# Patient Record
Sex: MALE | Race: WHITE | NOT HISPANIC OR LATINO | Employment: FULL TIME | URBAN - METROPOLITAN AREA
[De-identification: names, ages, dates, MRNs, and addresses within clinical notes are randomized per-mention and may not be internally consistent; named-entity substitution may affect disease eponyms.]

---

## 2023-08-09 ENCOUNTER — HOSPITAL ENCOUNTER (OUTPATIENT)
Dept: DATA CONVERSION | Facility: HOSPITAL | Age: 43
Discharge: HOME | End: 2023-08-09

## 2023-08-09 DIAGNOSIS — M25.851 OTHER SPECIFIED JOINT DISORDERS, RIGHT HIP: ICD-10-CM

## 2023-08-09 LAB
ALBUMIN SERPL-MCNC: 4.7 GM/DL (ref 3.5–5)
ALBUMIN/GLOB SERPL: 2 RATIO (ref 1.5–3)
ALP BLD-CCNC: 58 U/L (ref 35–125)
ALT SERPL-CCNC: 12 U/L (ref 5–40)
ANION GAP SERPL CALCULATED.3IONS-SCNC: 9 MMOL/L (ref 0–19)
ANTICOAGULANT: NORMAL
APTT PPP: 23.8 SEC (ref 22–32.5)
AST SERPL-CCNC: 15 U/L (ref 5–40)
BASOPHILS # BLD AUTO: 0 K/UL (ref 0–0.22)
BASOPHILS NFR BLD AUTO: 0 % (ref 0–1)
BILIRUB SERPL-MCNC: 0.5 MG/DL (ref 0.1–1.2)
BUN SERPL-MCNC: 15 MG/DL (ref 8–25)
BUN/CREAT SERPL: 16.7 RATIO (ref 8–21)
CALCIUM SERPL-MCNC: 10 MG/DL (ref 8.5–10.4)
CHLORIDE SERPL-SCNC: 105 MMOL/L (ref 97–107)
CO2 SERPL-SCNC: 23 MMOL/L (ref 24–31)
CREAT SERPL-MCNC: 0.9 MG/DL (ref 0.4–1.6)
DEPRECATED RDW RBC AUTO: 43 FL (ref 37–54)
DIFFERENTIAL METHOD BLD: ABNORMAL
EOSINOPHIL # BLD AUTO: 0.02 K/UL (ref 0–0.45)
EOSINOPHIL NFR BLD: 0.5 % (ref 0–3)
ERYTHROCYTE [DISTWIDTH] IN BLOOD BY AUTOMATED COUNT: 12.1 % (ref 11.7–15)
GFR SERPL CREATININE-BSD FRML MDRD: 109 ML/MIN/1.73 M2
GLOBULIN SER-MCNC: 2.3 G/DL (ref 1.9–3.7)
GLUCOSE SERPL-MCNC: 98 MG/DL (ref 65–99)
HCT VFR BLD AUTO: 46.6 % (ref 41–50)
HGB BLD-MCNC: 15.5 GM/DL (ref 13.5–16.5)
IMM GRANULOCYTES # BLD AUTO: 0 K/UL (ref 0–0.1)
INR PPP: 1 (ref 0.86–1.16)
LYMPHOCYTES # BLD AUTO: 1.46 K/UL (ref 1.2–3.2)
LYMPHOCYTES NFR BLD MANUAL: 34 % (ref 20–40)
MCH RBC QN AUTO: 31.6 PG (ref 26–34)
MCHC RBC AUTO-ENTMCNC: 33.3 % (ref 31–37)
MCV RBC AUTO: 94.9 FL (ref 80–100)
MONOCYTES # BLD AUTO: 0.33 K/UL (ref 0–0.8)
MONOCYTES NFR BLD MANUAL: 7.7 % (ref 0–8)
NEUTROPHILS # BLD AUTO: 2.47 K/UL
NEUTROPHILS # BLD AUTO: 2.49 K/UL (ref 1.8–7.7)
NEUTROPHILS.IMMATURE NFR BLD: 0 % (ref 0–1)
NEUTS SEG NFR BLD: 57.8 % (ref 50–70)
PLATELET # BLD AUTO: 130 K/UL (ref 150–450)
PLATELET BLD QL SMEAR: ABNORMAL
PMV BLD AUTO: 13.1 CU (ref 7–12.6)
POTASSIUM SERPL-SCNC: 4.4 MMOL/L (ref 3.4–5.1)
PROT SERPL-MCNC: 7 G/DL (ref 5.9–7.9)
PROTHROMBIN TIME: 10 SEC (ref 9.3–12.7)
RBC # BLD AUTO: 4.91 M/UL (ref 4.5–5.5)
SODIUM SERPL-SCNC: 137 MMOL/L (ref 133–145)
WBC # BLD AUTO: 4.3 K/UL (ref 4.5–11)

## 2023-08-22 ENCOUNTER — HOSPITAL ENCOUNTER (OUTPATIENT)
Dept: DATA CONVERSION | Facility: HOSPITAL | Age: 43
Discharge: HOME | End: 2023-08-22
Payer: COMMERCIAL

## 2023-08-22 DIAGNOSIS — M25.351 OTHER INSTABILITY, RIGHT HIP: ICD-10-CM

## 2023-08-22 DIAGNOSIS — S73.191A OTHER SPRAIN OF RIGHT HIP, INITIAL ENCOUNTER: ICD-10-CM

## 2023-08-22 DIAGNOSIS — M24.051 LOOSE BODY IN RIGHT HIP: ICD-10-CM

## 2023-08-22 DIAGNOSIS — M94.8X6 OTHER SPECIFIED DISORDERS OF CARTILAGE, LOWER LEG: ICD-10-CM

## 2023-08-22 DIAGNOSIS — M25.851 OTHER SPECIFIED JOINT DISORDERS, RIGHT HIP: ICD-10-CM

## 2023-10-02 PROBLEM — S73.191A TEAR OF RIGHT ACETABULAR LABRUM: Status: ACTIVE | Noted: 2023-10-02

## 2023-10-02 PROBLEM — S83.206A RIGHT KNEE MENISCAL TEAR: Status: ACTIVE | Noted: 2023-10-02

## 2023-10-02 PROBLEM — M25.851 FEMOROACETABULAR IMPINGEMENT OF RIGHT HIP: Status: ACTIVE | Noted: 2023-10-02

## 2023-10-02 RX ORDER — OXYCODONE AND ACETAMINOPHEN 5; 325 MG/1; MG/1
1 TABLET ORAL EVERY 4 HOURS PRN
COMMUNITY
End: 2024-02-14 | Stop reason: ALTCHOICE

## 2023-10-02 RX ORDER — GABAPENTIN 100 MG/1
100 CAPSULE ORAL NIGHTLY
COMMUNITY

## 2023-10-02 RX ORDER — CYCLOSPORINE 0.5 MG/ML
1 EMULSION OPHTHALMIC 2 TIMES DAILY
COMMUNITY

## 2023-10-02 RX ORDER — INDOMETHACIN 75 MG/1
75 CAPSULE, EXTENDED RELEASE ORAL EVERY EVENING
COMMUNITY
End: 2024-02-14 | Stop reason: ALTCHOICE

## 2023-10-02 RX ORDER — METHOCARBAMOL 750 MG/1
750 TABLET, FILM COATED ORAL 3 TIMES DAILY PRN
COMMUNITY
End: 2024-02-14 | Stop reason: ALTCHOICE

## 2023-10-02 RX ORDER — ALPRAZOLAM 0.25 MG/1
0.25 TABLET ORAL NIGHTLY PRN
COMMUNITY
End: 2024-02-14 | Stop reason: ALTCHOICE

## 2023-10-02 RX ORDER — ASPIRIN 81 MG/1
81 TABLET ORAL 2 TIMES DAILY
COMMUNITY
End: 2024-02-14 | Stop reason: ALTCHOICE

## 2023-10-02 RX ORDER — ONDANSETRON 4 MG/1
4 TABLET, ORALLY DISINTEGRATING ORAL EVERY 8 HOURS PRN
COMMUNITY
End: 2024-02-14 | Stop reason: ALTCHOICE

## 2023-10-02 RX ORDER — MAGNESIUM 250 MG
2 TABLET ORAL DAILY
COMMUNITY

## 2023-10-02 RX ORDER — FLUTICASONE FUROATE 27.5 UG/1
1 SPRAY, METERED NASAL DAILY PRN
COMMUNITY

## 2023-10-04 ENCOUNTER — OFFICE VISIT (OUTPATIENT)
Dept: ORTHOPEDIC SURGERY | Facility: HOSPITAL | Age: 43
End: 2023-10-04
Payer: COMMERCIAL

## 2023-10-04 DIAGNOSIS — S73.191D TEAR OF RIGHT ACETABULAR LABRUM, SUBSEQUENT ENCOUNTER: Primary | ICD-10-CM

## 2023-10-04 PROCEDURE — 99024 POSTOP FOLLOW-UP VISIT: CPT | Performed by: PHYSICIAN ASSISTANT

## 2023-10-04 NOTE — LETTER
October 4, 2023     Zaid Martinez MD  [address]  [facility], [place] [facility] [place] [address]    Patient: Roney Malik   YOB: 1980   Date of Visit: 10/4/2023       Dear Dr. Zaid Martinez MD:    Thank you for referring Roney Malik to me for evaluation. Below are my notes for this consultation.  If you have questions, please do not hesitate to call me. I look forward to following your patient along with you.       Sincerely,     Estrella Chavarria PA-C      CC: No Recipients  ______________________________________________________________________________________    Patient presents for POV R hip scope DOS 08/22/23. CT    Patient is here today 6 weeks out from his right hip arthritis.  Date of surgery 8/22/2023.  Overall he is doing well.  He states he is improving daily.  He has a little bit of pain in range of motion and he does have stiffness in the right hip compared to the left.  I think that is contributing to his current symptoms.  He is doing physical therapy at [facility] in [place] and I had him advised to him his therapist to defer strength and only work on range of motion and once his right side is equal to the left side he can get back to focusing on strength.  I will see him back in 6 weeks.      Estrella Chavarria PA-C

## 2023-10-04 NOTE — PROGRESS NOTES
Patient presents for POV R hip scope DOS 08/22/23. CT    Patient is here today 6 weeks out from his right hip arthritis.  Date of surgery 8/22/2023.  Overall he is doing well.  He states he is improving daily.  He has a little bit of pain in range of motion and he does have stiffness in the right hip compared to the left.  I think that is contributing to his current symptoms.  He is doing physical therapy at [facility] in [place] and I had him advised to him his therapist to defer strength and only work on range of motion and once his right side is equal to the left side he can get back to focusing on strength.  I will see him back in 6 weeks.      Estrella Chavarria PA-C

## 2023-11-15 ENCOUNTER — OFFICE VISIT (OUTPATIENT)
Dept: ORTHOPEDIC SURGERY | Facility: HOSPITAL | Age: 43
End: 2023-11-15
Payer: COMMERCIAL

## 2023-11-15 DIAGNOSIS — S73.191D TEAR OF RIGHT ACETABULAR LABRUM, SUBSEQUENT ENCOUNTER: Primary | ICD-10-CM

## 2023-11-15 PROCEDURE — 99024 POSTOP FOLLOW-UP VISIT: CPT | Performed by: PHYSICIAN ASSISTANT

## 2023-11-15 NOTE — PROGRESS NOTES
Patient presents for POV of right hip (8/22/23)    Patient is here today 12 weeks out from his right hip arthroscopy.  Date of surgery 8/22/2023.  Overall he is doing really well.  He has been back to work.  He states this past week he has actually had to do some work outside standing on some uneven gravel and this is really aggravated his hip.  He is only had to use a couple ibuprofen.  He continues to work physical therapy.  This pain is different than his preoperative pain.  He continues to have a little bit of tightness with flexion but they are continuing to work on that physical therapy and it is improving.  He is going avoid this outdoor standing while at work.  He stated he did not need a work note today but I am able to provide him 1 certainly if that is required.  He is also just can utilize over-the-counter anti-inflammatories.  We will plan to see him back in 3 months.  He also briefly mentions similar pain on his left hip.  He he has known femoral acetabular impingement but is not interested in surgery at this time but does plan on having him in the future.  He will continue conservative management including therapy exercises for the left hip as well.      Estrella Chavarria PA-C

## 2024-02-14 ENCOUNTER — OFFICE VISIT (OUTPATIENT)
Dept: ORTHOPEDIC SURGERY | Facility: HOSPITAL | Age: 44
End: 2024-02-14
Payer: COMMERCIAL

## 2024-02-14 ENCOUNTER — HOSPITAL ENCOUNTER (OUTPATIENT)
Dept: RADIOLOGY | Facility: HOSPITAL | Age: 44
Discharge: HOME | End: 2024-02-14
Payer: COMMERCIAL

## 2024-02-14 DIAGNOSIS — S73.191D TEAR OF RIGHT ACETABULAR LABRUM, SUBSEQUENT ENCOUNTER: ICD-10-CM

## 2024-02-14 DIAGNOSIS — M25.562 ACUTE BILATERAL KNEE PAIN: Primary | ICD-10-CM

## 2024-02-14 DIAGNOSIS — M25.561 ACUTE BILATERAL KNEE PAIN: Primary | ICD-10-CM

## 2024-02-14 DIAGNOSIS — M25.561 ACUTE BILATERAL KNEE PAIN: ICD-10-CM

## 2024-02-14 DIAGNOSIS — M25.562 ACUTE BILATERAL KNEE PAIN: ICD-10-CM

## 2024-02-14 DIAGNOSIS — M17.10 PATELLOFEMORAL ARTHROSIS: ICD-10-CM

## 2024-02-14 PROCEDURE — 73562 X-RAY EXAM OF KNEE 3: CPT | Mod: RT

## 2024-02-14 PROCEDURE — 1036F TOBACCO NON-USER: CPT | Performed by: PHYSICIAN ASSISTANT

## 2024-02-14 PROCEDURE — 99213 OFFICE O/P EST LOW 20 MIN: CPT | Performed by: PHYSICIAN ASSISTANT

## 2024-02-14 PROCEDURE — 73560 X-RAY EXAM OF KNEE 1 OR 2: CPT | Mod: LT

## 2024-02-14 NOTE — PROGRESS NOTES
Pov rt hip 8/22/23    Patient is here today 6 months out from his right hip arthroscopy.  Date of surgery 2023.  Overall he is doing really well.  He states he is significantly better compared to his last visit and is happy with his surgical outcome.  He has a little bit of soreness at times and a little bit of tightness with full flexion but he feels this continues to improve and again he is happy with his outcome.  He has occasional pain in the left hip but it is not enough to consider surgical intervention at this time.  His biggest concern right now is both of his knees    , Predominantly the left.  He states they have been making noise and it has been much more significant over the past week.  There is no swelling.  Prior to this his symptoms are predominantly with steps but now he has some discomfort predominantly with the noise even with walking.  He is concerned and wanted to get x-rays which she did obtain today.  There are no significant degenerative changes noted at this time.  Symptoms consistent with PF arthrosis.  We discussed therapeutic exercises and the possibility of a corticosteroid injection in the future.  He can follow-up as needed.      Estrella Chavarria PA-C

## 2024-11-18 ENCOUNTER — OFFICE VISIT (OUTPATIENT)
Dept: ORTHOPEDIC SURGERY | Facility: HOSPITAL | Age: 44
End: 2024-11-18
Payer: COMMERCIAL

## 2024-11-18 ENCOUNTER — HOSPITAL ENCOUNTER (OUTPATIENT)
Dept: RADIOLOGY | Facility: HOSPITAL | Age: 44
Discharge: HOME | End: 2024-11-18
Payer: COMMERCIAL

## 2024-11-18 DIAGNOSIS — M75.102 TEAR OF LEFT ROTATOR CUFF, UNSPECIFIED TEAR EXTENT, UNSPECIFIED WHETHER TRAUMATIC: Primary | ICD-10-CM

## 2024-11-18 DIAGNOSIS — M25.512 LEFT SHOULDER PAIN, UNSPECIFIED CHRONICITY: ICD-10-CM

## 2024-11-18 PROCEDURE — 99214 OFFICE O/P EST MOD 30 MIN: CPT | Performed by: ORTHOPAEDIC SURGERY

## 2024-11-18 PROCEDURE — 73030 X-RAY EXAM OF SHOULDER: CPT | Mod: LT

## 2024-11-18 PROCEDURE — 73030 X-RAY EXAM OF SHOULDER: CPT | Mod: LEFT SIDE | Performed by: RADIOLOGY

## 2024-11-20 NOTE — PROGRESS NOTES
Patient returns to see me today for his left shoulder.  We previously treated his hip and he is doing very well with that.  He had his shoulder surgery several years ago by Dr. Evans WALLIS for a biceps tenodesis.  He has had ongoing shoulder pain.  He had an MRI scan over a year ago back in 2018 that showed some partial-thickness tearing of his rotator cuff and recently underwent an MRI scan of his chest to evaluate his pectoralis.  The most recent MRI of his tach revealed no significant pathology.  His symptoms seem to be most consistent with a leading edge tear of the supraspinatus but he did not really have the MRI scan that was windowed to evaluate his rotator cuff properly at the last MRI that was just recently done.    Pleasant patient in no acute distress, alert and oriented x3, of normal mood and affect    they have no cervical or axillary lymphadenopathy.  they are breathing without any evidence of accessory musculature.  Their pupils are equal and reactive to light    Their neck is supple, nontender  They have intact sensation to light touch in all upper extremity dermatomes.  Their skin is intact    They have forward elevation of the shoulder 180°, internal rotation of 90°, external rotation of 90°  They Have 4 out of 5 Rotator cuff strength testing with resisted supraspinatus testing and infraspinatus testing  They have a normal belly press and lift off    They have mild tenderness to palpation over the long head of the biceps in the groove  They have a negative speed's test  They have an equivocal Yergason's test  They have s a positive Neer test, positive Gamez test    No a.c. joint tenderness.  Negative cross body    They have a negative O'Briens    At this point we are recommending an updated shoulder MRI because he has not had one since 2018 it certainly possible that he may have had increasing tearing of the rotator cuff which seems to be most consistent with his overall symptoms at this point.  Will  plan to see him back after the MRI scan is performed.

## 2024-12-02 ENCOUNTER — HOSPITAL ENCOUNTER (OUTPATIENT)
Dept: RADIOLOGY | Facility: CLINIC | Age: 44
Discharge: HOME | End: 2024-12-02
Payer: COMMERCIAL

## 2024-12-02 DIAGNOSIS — M75.102 TEAR OF LEFT ROTATOR CUFF, UNSPECIFIED TEAR EXTENT, UNSPECIFIED WHETHER TRAUMATIC: ICD-10-CM

## 2024-12-02 PROCEDURE — 73221 MRI JOINT UPR EXTREM W/O DYE: CPT | Mod: LEFT SIDE | Performed by: RADIOLOGY

## 2024-12-02 PROCEDURE — 73221 MRI JOINT UPR EXTREM W/O DYE: CPT | Mod: LT

## 2024-12-04 ENCOUNTER — OFFICE VISIT (OUTPATIENT)
Dept: ORTHOPEDIC SURGERY | Facility: HOSPITAL | Age: 44
End: 2024-12-04
Payer: COMMERCIAL

## 2024-12-04 DIAGNOSIS — M75.102 TEAR OF LEFT ROTATOR CUFF, UNSPECIFIED TEAR EXTENT, UNSPECIFIED WHETHER TRAUMATIC: Primary | ICD-10-CM

## 2024-12-04 PROCEDURE — 99213 OFFICE O/P EST LOW 20 MIN: CPT | Performed by: ORTHOPAEDIC SURGERY

## 2024-12-04 NOTE — PROGRESS NOTES
44 y.o. male here today for follow up and MRI review of his left shoulder.   He reports continued pain and weakness. He has been limiting his activity. He is now having trouble sleeping at night. He reports no relief from PT, NSAIDs, and injections in the AC joint and subacromial space. He denies neck pain and numbness and tingling.     He works as an . He has a history of a SAD and biceps tenodesis with Dr. Ng.     Physical Exam  Well healed incisions on inspection.   ROM 30/170/T12.   4/5 FE, 4/5 ER, 5/5 IR.   +Impingement testing.   N/V intact.       IMAGING  MRI demonstrates significant tendinosis of the supraspinatus tendon and proximal subscapularis. No appreciable full thickness rotator cuff tear.  There is also evidence of circumferential labral tearing and evidence of subacromial bursitis      ASSESSMENT/PLAN  44-year-old male with left shoulder pain and history of prior surgeries.  MRI demonstrates significant tendinosis of the supraspinatus tendon.    We discussed treatment options including continued conservative management, PRP injection, and surgery.  Surgery would consist of left shoulder arthroscopy with extensive debridement, revision subacromial decompression, and likely biologic augmentation if no full-thickness rotator cuff tear is appreciated on arthroscopy.      He will consider his options and call us with his decision.    We discussed risks which included but were not limited to bleeding, infection, damage to nerves or blood vessels, blood clots, progression of osteoarthritis, need for further procedures, risks of anesthesia which included heart attack stroke and death.  Patient understood the risks all of their questions were answered to their satisfaction.  They would like to proceed with operative intervention.    Patient was prescribed a shoulder immobilizer for partial-thickness rotator cuff tearing. The patient has weakness, instability and/or deformity of their left  shoulder which requires stabilization from this orthosis to improve their function.      Verbal and written instructions for the use, wear schedule, cleaning and application of this item were given.  Patient was instructed that should the brace result in increased pain, decreased sensation, increased swelling, or an overall worsening of their medical condition, to please contact our office immediately.     Orthotic management and training was provided for skin care, modifications due to healing tissues, edema changes, interruption in skin integrity, and safety precautions with the orthosis.

## 2024-12-09 ENCOUNTER — TELEPHONE (OUTPATIENT)
Dept: ORTHOPEDIC SURGERY | Facility: HOSPITAL | Age: 44
End: 2024-12-09
Payer: COMMERCIAL

## 2024-12-09 NOTE — TELEPHONE ENCOUNTER
Copied from CRM #[id number]. Topic: Information Request - Trying to reach PCP  >> Dec 6, 2024  1:27 PM Damaris HOPSON wrote:  Good afternoon. This pt of Dr. Agustin Vasquez called requesting a call back regarding scheduling a surgery for his left shoulder. He said he left a voice mail with the office a couple of days ago, but hasn't heard back. Please assist. Thank you!

## 2024-12-12 PROBLEM — M67.814 BICEPS TENDONOSIS OF LEFT SHOULDER: Status: ACTIVE | Noted: 2024-12-11

## 2024-12-12 PROBLEM — S43.432A SUPERIOR GLENOID LABRUM LESION OF LEFT SHOULDER: Status: ACTIVE | Noted: 2024-12-11

## 2025-01-07 ENCOUNTER — PRE-ADMISSION TESTING (OUTPATIENT)
Dept: PREADMISSION TESTING | Facility: HOSPITAL | Age: 45
End: 2025-01-07
Payer: COMMERCIAL

## 2025-01-07 VITALS
TEMPERATURE: 97.9 F | HEART RATE: 76 BPM | SYSTOLIC BLOOD PRESSURE: 138 MMHG | DIASTOLIC BLOOD PRESSURE: 80 MMHG | RESPIRATION RATE: 16 BRPM | OXYGEN SATURATION: 99 % | HEIGHT: 74 IN | WEIGHT: 185 LBS | BODY MASS INDEX: 23.74 KG/M2

## 2025-01-07 DIAGNOSIS — Z01.818 PREOP TESTING: Primary | ICD-10-CM

## 2025-01-07 DIAGNOSIS — M67.814 BICEPS TENDONOSIS OF LEFT SHOULDER: ICD-10-CM

## 2025-01-07 DIAGNOSIS — S43.432A SUPERIOR GLENOID LABRUM LESION OF LEFT SHOULDER, INITIAL ENCOUNTER: ICD-10-CM

## 2025-01-07 LAB
ANION GAP SERPL CALC-SCNC: 12 MMOL/L (ref 10–20)
BASOPHILS # BLD AUTO: 0.02 X10*3/UL (ref 0–0.1)
BASOPHILS NFR BLD AUTO: 0.3 %
BUN SERPL-MCNC: 26 MG/DL (ref 6–23)
CALCIUM SERPL-MCNC: 10.1 MG/DL (ref 8.6–10.3)
CHLORIDE SERPL-SCNC: 103 MMOL/L (ref 98–107)
CO2 SERPL-SCNC: 27 MMOL/L (ref 21–32)
CREAT SERPL-MCNC: 1.03 MG/DL (ref 0.5–1.3)
EGFRCR SERPLBLD CKD-EPI 2021: >90 ML/MIN/1.73M*2
EOSINOPHIL # BLD AUTO: 0.05 X10*3/UL (ref 0–0.7)
EOSINOPHIL NFR BLD AUTO: 0.9 %
ERYTHROCYTE [DISTWIDTH] IN BLOOD BY AUTOMATED COUNT: 11.6 % (ref 11.5–14.5)
GLUCOSE SERPL-MCNC: 92 MG/DL (ref 74–99)
HCT VFR BLD AUTO: 44.8 % (ref 41–52)
HGB BLD-MCNC: 14.9 G/DL (ref 13.5–17.5)
IMM GRANULOCYTES # BLD AUTO: 0.01 X10*3/UL (ref 0–0.7)
IMM GRANULOCYTES NFR BLD AUTO: 0.2 % (ref 0–0.9)
LYMPHOCYTES # BLD AUTO: 1.78 X10*3/UL (ref 1.2–4.8)
LYMPHOCYTES NFR BLD AUTO: 31.1 %
MCH RBC QN AUTO: 30.7 PG (ref 26–34)
MCHC RBC AUTO-ENTMCNC: 33.3 G/DL (ref 32–36)
MCV RBC AUTO: 92 FL (ref 80–100)
MONOCYTES # BLD AUTO: 0.38 X10*3/UL (ref 0.1–1)
MONOCYTES NFR BLD AUTO: 6.6 %
NEUTROPHILS # BLD AUTO: 3.49 X10*3/UL (ref 1.2–7.7)
NEUTROPHILS NFR BLD AUTO: 60.9 %
NRBC BLD-RTO: NORMAL /100{WBCS}
PLATELET # BLD AUTO: 154 X10*3/UL (ref 150–450)
POTASSIUM SERPL-SCNC: 4.2 MMOL/L (ref 3.5–5.3)
RBC # BLD AUTO: 4.85 X10*6/UL (ref 4.5–5.9)
SODIUM SERPL-SCNC: 138 MMOL/L (ref 136–145)
WBC # BLD AUTO: 5.7 X10*3/UL (ref 4.4–11.3)

## 2025-01-07 PROCEDURE — 36415 COLL VENOUS BLD VENIPUNCTURE: CPT

## 2025-01-07 PROCEDURE — 85025 COMPLETE CBC W/AUTO DIFF WBC: CPT

## 2025-01-07 PROCEDURE — 99203 OFFICE O/P NEW LOW 30 MIN: CPT | Performed by: NURSE PRACTITIONER

## 2025-01-07 PROCEDURE — 82374 ASSAY BLOOD CARBON DIOXIDE: CPT

## 2025-01-07 RX ORDER — NIFEDIPINE 30 MG/1
2 TABLET, EXTENDED RELEASE ORAL
COMMUNITY
Start: 2024-12-27

## 2025-01-07 RX ORDER — NAPROXEN 250 MG/1
250 TABLET ORAL
COMMUNITY

## 2025-01-07 ASSESSMENT — DUKE ACTIVITY SCORE INDEX (DASI)
CAN YOU RUN A SHORT DISTANCE: YES
CAN YOU WALK INDOORS, SUCH AS AROUND YOUR HOUSE: YES
TOTAL_SCORE: 58.2
CAN YOU HAVE SEXUAL RELATIONS: YES
DASI METS SCORE: 9.9
CAN YOU DO MODERATE WORK AROUND THE HOUSE LIKE VACUUMING, SWEEPING FLOORS OR CARRYING GROCERIES: YES
CAN YOU DO LIGHT WORK AROUND THE HOUSE LIKE DUSTING OR WASHING DISHES: YES
CAN YOU CLIMB A FLIGHT OF STAIRS OR WALK UP A HILL: YES
CAN YOU PARTICIPATE IN STRENOUS SPORTS LIKE SWIMMING, SINGLES TENNIS, FOOTBALL, BASKETBALL, OR SKIING: YES
CAN YOU DO YARD WORK LIKE RAKING LEAVES, WEEDING OR PUSHING A MOWER: YES
CAN YOU DO HEAVY WORK AROUND THE HOUSE LIKE SCRUBBING FLOORS OR LIFTING AND MOVING HEAVY FURNITURE: YES
CAN YOU WALK A BLOCK OR TWO ON LEVEL GROUND: YES
CAN YOU TAKE CARE OF YOURSELF (EAT, DRESS, BATHE, OR USE TOILET): YES
CAN YOU PARTICIPATE IN MODERATE RECREATIONAL ACTIVITIES LIKE GOLF, BOWLING, DANCING, DOUBLES TENNIS OR THROWING A BASEBALL OR FOOTBALL: YES

## 2025-01-07 ASSESSMENT — PAIN SCALES - GENERAL: PAINLEVEL_OUTOF10: 0 - NO PAIN

## 2025-01-07 ASSESSMENT — ENCOUNTER SYMPTOMS
ENDOCRINE NEGATIVE: 1
ARTHRALGIAS: 1
CONSTITUTIONAL NEGATIVE: 1
EYES NEGATIVE: 1
NECK NEGATIVE: 1
CARDIOVASCULAR NEGATIVE: 1
RESPIRATORY NEGATIVE: 1
NEUROLOGICAL NEGATIVE: 1
GASTROINTESTINAL NEGATIVE: 1

## 2025-01-07 ASSESSMENT — LIFESTYLE VARIABLES: SMOKING_STATUS: NONSMOKER

## 2025-01-07 ASSESSMENT — PAIN - FUNCTIONAL ASSESSMENT: PAIN_FUNCTIONAL_ASSESSMENT: 0-10

## 2025-01-07 NOTE — CPM/PAT H&P
CPM/PAT Evaluation       Name: Roney Malik (Roney Malik)  /Age: 1980/44 y.o.     Visit Type:   In-Person       Chief Complaint: superior glenoid labrum lesion of L shoulder, biceps tendonesis of L shoulder    HPI 45 yo male who is referred to PAT by Dr Vasquez for evaluation in advance of his L shoulder scope, revision subacromial, decompression, labral debridement biological augmentation 25. He has failed conservative therapy. He has pain and weakness and having trouble sleeping due to this.     See PMH below    Past Medical History:   Diagnosis Date    ADHD (attention deficit hyperactivity disorder)     Joint pain     Peripheral neuropathy     Raynaud disease     Thoracic aortic aneurysm (CMS-HCC)     Unspecified tear of unspecified meniscus, current injury, right knee, initial encounter 2018    Acute meniscal tear of knee, right, initial encounter       Past Surgical History:   Procedure Laterality Date    ANTERIOR CRUCIATE LIGAMENT REPAIR      MICRODISCECTOMY LUMBAR      ROTATOR CUFF REPAIR         Patient Sexual activity questions deferred to the physician.    Family History   Problem Relation Name Age of Onset    Other (htn) Mother      Mental illness Mother      Coronary artery disease Father      Other (htn) Father         No Known Allergies    Medication Documentation Review Audit       Reviewed by Radha Mandujano RN (Registered Nurse) on 25 at 1520      Medication Order Taking? Sig Documenting Provider Last Dose Status   cycloSPORINE (Restasis) 0.05 % ophthalmic emulsion [id number] Yes Administer 1 drop into both eyes 2 times a day. Historical Provider, MD 2025 Active   fluticasone (Flonase Sensimist) 27.5 mcg/actuation nasal spray [id number] No Administer 1 spray into each nostril once daily as needed (nasal congestion).   Patient not taking: Reported on 2025    Historical Provider, MD More than a month Active   gabapentin (Neurontin) 100 mg capsule [id number] Yes Take  "1 capsule (100 mg) by mouth once daily at bedtime. Historical Provider, MD 1/6/2025 Active   magnesium 250 mg tablet [id number] Yes Take 2 tablets (500 mg) by mouth once daily. Historical Provider, MD 1/6/2025 Active   naproxen (Naprosyn) 250 mg tablet [id number] Yes Take 1 tablet (250 mg) by mouth 2 times daily (morning and late afternoon). Hernandez Provider, MD 1/6/2025 Active   NIFEdipine XL 30 mg 24 hr tablet [id number] Yes Take 2 tablets (60 mg) by mouth early in the morning.. Hernandez Provider, MD 1/7/2025 Active   tumeric-ging-olive-oreg-capryl 100 mg-150 mg- 50 mg-150 mg capsule [id number] Yes Take 1 capsule by mouth once daily in the morning. Hernandez Provider, MD 1/7/2025 Active                  PAT ROS:   Constitutional:   neg    Neuro/Psych:   neg    Eyes:   neg    Ears:   neg    Nose:   neg    Mouth:   neg    Throat:   neg    Neck:   neg    Cardio:   neg    Respiratory:   neg    Endocrine:   neg    GI:   neg    :   neg    Musculoskeletal:    See HPI   arthralgias  Hematologic:   neg    Skin:  neg        Physical Exam  Vitals and nursing note reviewed. Physical exam within normal limits.          PAT AIRWAY:   Airway:     Mallampati::  III    Neck ROM::  Full    Visit Vitals  /80   Pulse 76   Temp 36.6 °C (97.9 °F)   Resp 16   Ht 1.88 m (6' 2\")   Wt 83.9 kg (185 lb)   SpO2 99%   BMI 23.75 kg/m²   Smoking Status Never   BSA 2.09 m²       DASI Risk Score      Flowsheet Row Pre-Admission Testing from 1/7/2025 in [facility]   Can you take care of yourself (eat, dress, bathe, or use toilet)?  2.75 filed at 01/07/2025 1558   Can you walk indoors, such as around your house? 1.75 filed at 01/07/2025 1558   Can you walk a block or two on level ground?  2.75 filed at 01/07/2025 1558   Can you climb a flight of stairs or walk up a hill? 5.5 filed at 01/07/2025 1558   Can you run a short distance? 8 filed at 01/07/2025 1558   Can you do light work around the house like dusting or washing " dishes? 2.7 filed at 01/07/2025 1558   Can you do moderate work around the house like vacuuming, sweeping floors or carrying groceries? 3.5 filed at 01/07/2025 1558   Can you do heavy work around the house like scrubbing floors or lifting and moving heavy furniture?  8 filed at 01/07/2025 1558   Can you do yard work like raking leaves, weeding or pushing a mower? 4.5 filed at 01/07/2025 1558   Can you have sexual relations? 5.25 filed at 01/07/2025 1558   Can you participate in moderate recreational activities like golf, bowling, dancing, doubles tennis or throwing a baseball or football? 6 filed at 01/07/2025 1558   Can you participate in strenous sports like swimming, singles tennis, football, basketball, or skiing? 7.5 filed at 01/07/2025 1558   DASI SCORE 58.2 filed at 01/07/2025 1558   METS Score (Will be calculated only when all the questions are answered) 9.9 filed at 01/07/2025 1558          Caprini DVT Assessment      Flowsheet Row Pre-Admission Testing from 1/7/2025 in [facility]   DVT Score 5 filed at 01/07/2025 1558   Surgical Factors Major surgery planned, lasting 2-3 hours filed at 01/07/2025 1558   BMI 30 or less filed at 01/07/2025 1558          Modified Frailty Index    No data to display       CHADS2 Stroke Risk         N/A 3 to 100%: High Risk   2 to < 3%: Medium Risk   0 to < 2%: Low Risk     Last Change: N/A          This score determines the patient's risk of having a stroke if the patient has atrial fibrillation.        This score is not applicable to this patient. Components are not calculated.          Revised Cardiac Risk Index      Flowsheet Row Pre-Admission Testing from 1/7/2025 in [facility]   High-Risk Surgery (Intraperitoneal, Intrathoracic,Suprainguinal vascular) 0 filed at 01/07/2025 1559   History of ischemic heart disease (History of MI, History of positive exercuse test, Current chest paint considered due to myocardial ischemia, Use of nitrate  therapy, ECG with pathological Q Waves) 0 filed at 01/07/2025 1559   History of congestive heart failure (pulmonary edemia, bilateral rales or S3 gallop, Paroxysmal nocturnal dyspnea, CXR showing pulmonary vascular redistribution) 0 filed at 01/07/2025 1559   History of cerebrovascular disease (Prior TIA or stroke) 0 filed at 01/07/2025 1559   Pre-operative insulin treatment 0 filed at 01/07/2025 1559   Pre-operative creatinine>2 mg/dl 0 filed at 01/07/2025 1559   Revised Cardiac Risk Calculator 0 filed at 01/07/2025 1559          Apfel Simplified Score      Flowsheet Row Pre-Admission Testing from 1/7/2025 in [facility]   Smoking status 1 filed at 01/07/2025 1559   History of motion sickness or PONV  1 filed at 01/07/2025 1559   Gender - Female 0=No filed at 01/07/2025 1559          Risk Analysis Index Results This Encounter    No data found in the last 10 encounters.       Stop Bang Score      Flowsheet Row Pre-Admission Testing from 1/7/2025 in [facility]   Do you snore loudly? 0 filed at 01/07/2025 1520   Do you often feel tired or fatigued after your sleep? 0 filed at 01/07/2025 1520   Has anyone ever observed you stop breathing in your sleep? 0 filed at 01/07/2025 1520   Do you have or are you being treated for high blood pressure? 0 filed at 01/07/2025 1520   Recent BMI (Calculated) 23.7 filed at 01/07/2025 1520   Is BMI greater than 35 kg/m2? 0=No filed at 01/07/2025 1520   Age older than 50 years old? 0=No filed at 01/07/2025 1520   Is your neck circumference greater than 17 inches (Male) or 16 inches (Female)? 0 filed at 01/07/2025 1520   Gender - Male 1=Yes filed at 01/07/2025 1520   STOP-BANG Total Score 1 filed at 01/07/2025 1520          Prodigy: High Risk  Total Score: 8              Prodigy Gender Score          ARISCAT Score for Postoperative Pulmonary Complications      Flowsheet Row Pre-Admission Testing from 1/7/2025 in [facility]   Age, years  0  filed at 01/07/2025 1559   Preoperative SpO2 0 filed at 01/07/2025 1559   Respiratory infection in the last month Either upper or lower (i.e., URI, bronchitis, pneumonia), with fever and antibiotic treatment 0 filed at 01/07/2025 1559   Preoperative anemoa (Hgb less than 10 g/dl) 0 filed at 01/07/2025 1559   Surgical incision  0 filed at 01/07/2025 1559   Duration of surgery  16 filed at 01/07/2025 1559          Gisella Perioperative Risk for Myocardial Infarction or Cardiac Arrest (RACHEL)      Flowsheet Row Pre-Admission Testing from 1/7/2025 in [facility]   Age 0.87 filed at 01/07/2025 1559   Functional Status  0 filed at 01/07/2025 1559   ASA Class  -5.17 filed at 01/07/2025 1559   Creatinine 0 filed at 01/07/2025 1559   Type of Procedure  0.80 filed at 01/07/2025 1559   RACHEL Total Score  -8.76 filed at 01/07/2025 1559   RACHEL % 0.02 filed at 01/07/2025 1559          Assessment and Plan   43 yo male presents to [facility] for risk assessment and preoperative optimization in advance of his shoulder surgery.    Today his VS are stable, he is afebrile, his pulse ox is 99% at room air at rest    Neuro:  S/p spine surgery left with residual tingling in foot due to nerve entrapment syndrome of L foot, is on gabapentin- he is also on Procardia  H/O padilla's neuroma R foot  H/o LS degeneration     HEENT/Airway:  No diagnosis or significant findings on chart review or clinical presentation and evaluation.     Cardiovascular:  Denies chest pain, shortness of breathe, dizziness, palpations, or lightheadedness    He is being followed at [facility] for mild ectasia of aortic root, per MRI 2022 stable   Acceptable surgical risk. No additional preoperative testing is currently indicated.    H/O Raynaud's phenomenon, followed at [facility]    CAC 9/28/2023 0    Pulmonary:  No diagnosis or significant findings on chart review or clinical presentation and evaluation.     PRODIGY: Low risk for opioid induced respiratory  "depression  Discussed smoking cessation and deep breathing handout given    Renal:   No diagnosis or significant findings on chart review, or clinical presentation and evaluation.     Pt at Low risk for perioperative MYRA based on Dynamic Predictive Scoring Tool for Perioperative MYRA  Lab Results   Component Value Date    GLUCOSE 98 08/09/2023    CALCIUM 10.0 08/09/2023     08/09/2023    K 4.4 08/09/2023    CO2 23 (L) 08/09/2023     08/09/2023    BUN 15 08/09/2023    CREATININE 0.9 08/09/2023       Endocrine:  No diagnosis or significant findings on chart review or clinical presentation and evaluation.     No results found for: \"HGBA1C\"    Hematologic:  No diagnosis or significant findings on chart review or clinical presentation and evaluation.  Lab Results   Component Value Date    WBC 4.3 (L) 08/09/2023    HGB 15.5 08/09/2023    HCT 46.6 08/09/2023    MCV 94.9 08/09/2023     (L) 08/09/2023       Pt supplied education/VTE handout    Gastrointestinal:   No diagnosis or significant findings on chart review or clinical presentation and evaluation.   EAT-10 score of 0 - self-perceived oropharyngeal dysphagia scale (0-40)      :  No diagnosis or significant findings on chart review or clinical presentation and evaluation.     Infectious disease:   No diagnosis or significant findings on chart review or clinical presentation and evaluation.     Musculoskeletal:   See HPI    Preoperative risk assessment  ASA II  NSQIP - Predicted length of stay days 0-1  PAT Testing - cbc, bmp    Anesthesia:  No anesthesia complications    denies dental issues  Smoker- denies  Drugs-denies  ETOH-denies  denies personal/family issues with Anesthesia    Face to Face patient contact time 30 min    DARLING Gutierrez-CNP 1/7/2025 3:59 PM      "

## 2025-01-07 NOTE — H&P (VIEW-ONLY)
CPM/PAT Evaluation       Name: Roney Malik (Roney Malik)  /Age: 1980/44 y.o.     Visit Type:   In-Person       Chief Complaint: superior glenoid labrum lesion of L shoulder, biceps tendonesis of L shoulder    HPI 45 yo male who is referred to PAT by Dr Vasquez for evaluation in advance of his L shoulder scope, revision subacromial, decompression, labral debridement biological augmentation 25. He has failed conservative therapy. He has pain and weakness and having trouble sleeping due to this.     See PMH below    Past Medical History:   Diagnosis Date    ADHD (attention deficit hyperactivity disorder)     Joint pain     Peripheral neuropathy     Raynaud disease     Thoracic aortic aneurysm (CMS-HCC)     Unspecified tear of unspecified meniscus, current injury, right knee, initial encounter 2018    Acute meniscal tear of knee, right, initial encounter       Past Surgical History:   Procedure Laterality Date    ANTERIOR CRUCIATE LIGAMENT REPAIR      MICRODISCECTOMY LUMBAR      ROTATOR CUFF REPAIR         Patient Sexual activity questions deferred to the physician.    Family History   Problem Relation Name Age of Onset    Other (htn) Mother      Mental illness Mother      Coronary artery disease Father      Other (htn) Father         No Known Allergies    Medication Documentation Review Audit       Reviewed by Radha Mandujano RN (Registered Nurse) on 25 at 1520      Medication Order Taking? Sig Documenting Provider Last Dose Status   cycloSPORINE (Restasis) 0.05 % ophthalmic emulsion [id number] Yes Administer 1 drop into both eyes 2 times a day. Historical Provider, MD 2025 Active   fluticasone (Flonase Sensimist) 27.5 mcg/actuation nasal spray [id number] No Administer 1 spray into each nostril once daily as needed (nasal congestion).   Patient not taking: Reported on 2025    Historical Provider, MD More than a month Active   gabapentin (Neurontin) 100 mg capsule [id number] Yes Take  "1 capsule (100 mg) by mouth once daily at bedtime. Historical Provider, MD 1/6/2025 Active   magnesium 250 mg tablet [id number] Yes Take 2 tablets (500 mg) by mouth once daily. Historical Provider, MD 1/6/2025 Active   naproxen (Naprosyn) 250 mg tablet [id number] Yes Take 1 tablet (250 mg) by mouth 2 times daily (morning and late afternoon). Hernandez Provider, MD 1/6/2025 Active   NIFEdipine XL 30 mg 24 hr tablet [id number] Yes Take 2 tablets (60 mg) by mouth early in the morning.. Hernandez Provider, MD 1/7/2025 Active   tumeric-ging-olive-oreg-capryl 100 mg-150 mg- 50 mg-150 mg capsule [id number] Yes Take 1 capsule by mouth once daily in the morning. Hernandez Provider, MD 1/7/2025 Active                  PAT ROS:   Constitutional:   neg    Neuro/Psych:   neg    Eyes:   neg    Ears:   neg    Nose:   neg    Mouth:   neg    Throat:   neg    Neck:   neg    Cardio:   neg    Respiratory:   neg    Endocrine:   neg    GI:   neg    :   neg    Musculoskeletal:    See HPI   arthralgias  Hematologic:   neg    Skin:  neg        Physical Exam  Vitals and nursing note reviewed. Physical exam within normal limits.          PAT AIRWAY:   Airway:     Mallampati::  III    Neck ROM::  Full    Visit Vitals  /80   Pulse 76   Temp 36.6 °C (97.9 °F)   Resp 16   Ht 1.88 m (6' 2\")   Wt 83.9 kg (185 lb)   SpO2 99%   BMI 23.75 kg/m²   Smoking Status Never   BSA 2.09 m²       DASI Risk Score      Flowsheet Row Pre-Admission Testing from 1/7/2025 in [facility]   Can you take care of yourself (eat, dress, bathe, or use toilet)?  2.75 filed at 01/07/2025 1558   Can you walk indoors, such as around your house? 1.75 filed at 01/07/2025 1558   Can you walk a block or two on level ground?  2.75 filed at 01/07/2025 1558   Can you climb a flight of stairs or walk up a hill? 5.5 filed at 01/07/2025 1558   Can you run a short distance? 8 filed at 01/07/2025 1558   Can you do light work around the house like dusting or washing " dishes? 2.7 filed at 01/07/2025 1558   Can you do moderate work around the house like vacuuming, sweeping floors or carrying groceries? 3.5 filed at 01/07/2025 1558   Can you do heavy work around the house like scrubbing floors or lifting and moving heavy furniture?  8 filed at 01/07/2025 1558   Can you do yard work like raking leaves, weeding or pushing a mower? 4.5 filed at 01/07/2025 1558   Can you have sexual relations? 5.25 filed at 01/07/2025 1558   Can you participate in moderate recreational activities like golf, bowling, dancing, doubles tennis or throwing a baseball or football? 6 filed at 01/07/2025 1558   Can you participate in strenous sports like swimming, singles tennis, football, basketball, or skiing? 7.5 filed at 01/07/2025 1558   DASI SCORE 58.2 filed at 01/07/2025 1558   METS Score (Will be calculated only when all the questions are answered) 9.9 filed at 01/07/2025 1558          Caprini DVT Assessment      Flowsheet Row Pre-Admission Testing from 1/7/2025 in [facility]   DVT Score 5 filed at 01/07/2025 1558   Surgical Factors Major surgery planned, lasting 2-3 hours filed at 01/07/2025 1558   BMI 30 or less filed at 01/07/2025 1558          Modified Frailty Index    No data to display       CHADS2 Stroke Risk         N/A 3 to 100%: High Risk   2 to < 3%: Medium Risk   0 to < 2%: Low Risk     Last Change: N/A          This score determines the patient's risk of having a stroke if the patient has atrial fibrillation.        This score is not applicable to this patient. Components are not calculated.          Revised Cardiac Risk Index      Flowsheet Row Pre-Admission Testing from 1/7/2025 in [facility]   High-Risk Surgery (Intraperitoneal, Intrathoracic,Suprainguinal vascular) 0 filed at 01/07/2025 1559   History of ischemic heart disease (History of MI, History of positive exercuse test, Current chest paint considered due to myocardial ischemia, Use of nitrate  therapy, ECG with pathological Q Waves) 0 filed at 01/07/2025 1559   History of congestive heart failure (pulmonary edemia, bilateral rales or S3 gallop, Paroxysmal nocturnal dyspnea, CXR showing pulmonary vascular redistribution) 0 filed at 01/07/2025 1559   History of cerebrovascular disease (Prior TIA or stroke) 0 filed at 01/07/2025 1559   Pre-operative insulin treatment 0 filed at 01/07/2025 1559   Pre-operative creatinine>2 mg/dl 0 filed at 01/07/2025 1559   Revised Cardiac Risk Calculator 0 filed at 01/07/2025 1559          Apfel Simplified Score      Flowsheet Row Pre-Admission Testing from 1/7/2025 in [facility]   Smoking status 1 filed at 01/07/2025 1559   History of motion sickness or PONV  1 filed at 01/07/2025 1559   Gender - Female 0=No filed at 01/07/2025 1559          Risk Analysis Index Results This Encounter    No data found in the last 10 encounters.       Stop Bang Score      Flowsheet Row Pre-Admission Testing from 1/7/2025 in [facility]   Do you snore loudly? 0 filed at 01/07/2025 1520   Do you often feel tired or fatigued after your sleep? 0 filed at 01/07/2025 1520   Has anyone ever observed you stop breathing in your sleep? 0 filed at 01/07/2025 1520   Do you have or are you being treated for high blood pressure? 0 filed at 01/07/2025 1520   Recent BMI (Calculated) 23.7 filed at 01/07/2025 1520   Is BMI greater than 35 kg/m2? 0=No filed at 01/07/2025 1520   Age older than 50 years old? 0=No filed at 01/07/2025 1520   Is your neck circumference greater than 17 inches (Male) or 16 inches (Female)? 0 filed at 01/07/2025 1520   Gender - Male 1=Yes filed at 01/07/2025 1520   STOP-BANG Total Score 1 filed at 01/07/2025 1520          Prodigy: High Risk  Total Score: 8              Prodigy Gender Score          ARISCAT Score for Postoperative Pulmonary Complications      Flowsheet Row Pre-Admission Testing from 1/7/2025 in [facility]   Age, years  0  filed at 01/07/2025 1559   Preoperative SpO2 0 filed at 01/07/2025 1559   Respiratory infection in the last month Either upper or lower (i.e., URI, bronchitis, pneumonia), with fever and antibiotic treatment 0 filed at 01/07/2025 1559   Preoperative anemoa (Hgb less than 10 g/dl) 0 filed at 01/07/2025 1559   Surgical incision  0 filed at 01/07/2025 1559   Duration of surgery  16 filed at 01/07/2025 1559          Gisella Perioperative Risk for Myocardial Infarction or Cardiac Arrest (RACHEL)      Flowsheet Row Pre-Admission Testing from 1/7/2025 in [facility]   Age 0.86 filed at 01/07/2025 1559   Functional Status  0 filed at 01/07/2025 1559   ASA Class  -5.17 filed at 01/07/2025 1559   Creatinine 0 filed at 01/07/2025 1559   Type of Procedure  0.80 filed at 01/07/2025 1559   RACHEL Total Score  -8.76 filed at 01/07/2025 1559   RACHEL % 0.02 filed at 01/07/2025 1559          Assessment and Plan   43 yo male presents to [facility] for risk assessment and preoperative optimization in advance of his shoulder surgery.    Today his VS are stable, he is afebrile, his pulse ox is 99% at room air at rest    Neuro:  S/p spine surgery left with residual tingling in foot due to nerve entrapment syndrome of L foot, is on gabapentin- he is also on Procardia  H/O padilla's neuroma R foot  H/o LS degeneration     HEENT/Airway:  No diagnosis or significant findings on chart review or clinical presentation and evaluation.     Cardiovascular:  Denies chest pain, shortness of breathe, dizziness, palpations, or lightheadedness    He is being followed at [facility] for mild ectasia of aortic root, per MRI 2022 stable   Acceptable surgical risk. No additional preoperative testing is currently indicated.    H/O Raynaud's phenomenon, followed at [facility]    CAC 9/28/2023 0    Pulmonary:  No diagnosis or significant findings on chart review or clinical presentation and evaluation.     PRODIGY: Low risk for opioid induced respiratory  "depression  Discussed smoking cessation and deep breathing handout given    Renal:   No diagnosis or significant findings on chart review, or clinical presentation and evaluation.     Pt at Low risk for perioperative MYRA based on Dynamic Predictive Scoring Tool for Perioperative MYRA  Lab Results   Component Value Date    GLUCOSE 98 08/09/2023    CALCIUM 10.0 08/09/2023     08/09/2023    K 4.4 08/09/2023    CO2 23 (L) 08/09/2023     08/09/2023    BUN 15 08/09/2023    CREATININE 0.9 08/09/2023       Endocrine:  No diagnosis or significant findings on chart review or clinical presentation and evaluation.     No results found for: \"HGBA1C\"    Hematologic:  No diagnosis or significant findings on chart review or clinical presentation and evaluation.  Lab Results   Component Value Date    WBC 4.3 (L) 08/09/2023    HGB 15.5 08/09/2023    HCT 46.6 08/09/2023    MCV 94.9 08/09/2023     (L) 08/09/2023       Pt supplied education/VTE handout    Gastrointestinal:   No diagnosis or significant findings on chart review or clinical presentation and evaluation.   EAT-10 score of 0 - self-perceived oropharyngeal dysphagia scale (0-40)      :  No diagnosis or significant findings on chart review or clinical presentation and evaluation.     Infectious disease:   No diagnosis or significant findings on chart review or clinical presentation and evaluation.     Musculoskeletal:   See HPI    Preoperative risk assessment  ASA II  NSQIP - Predicted length of stay days 0-1  PAT Testing - cbc, bmp    Anesthesia:  No anesthesia complications    denies dental issues  Smoker- denies  Drugs-denies  ETOH-denies  denies personal/family issues with Anesthesia    Face to Face patient contact time 30 min    DARLING Gutierrez-CNP 1/7/2025 3:59 PM      "

## 2025-01-07 NOTE — PREPROCEDURE INSTRUCTIONS
Medication List            Accurate as of January 7, 2025  3:40 PM. Always use your most recent med list.                Flonase Sensimist 27.5 mcg/actuation nasal spray  Generic drug: fluticasone  Medication Adjustments for Surgery: Take/Use as prescribed     gabapentin 100 mg capsule  Commonly known as: Neurontin  Medication Adjustments for Surgery: Take/Use as prescribed     magnesium 250 mg tablet  Additional Medication Adjustments for Surgery: Take last dose 7 days before surgery  Notes to patient: Last dose 1/13/25     naproxen 250 mg tablet  Commonly known as: Naprosyn  Additional Medication Adjustments for Surgery: Take last dose 7 days before surgery  Notes to patient: Last dose 1/13/25     NIFEdipine ER 30 mg 24 hr tablet  Commonly known as: Adalat CC  Medication Adjustments for Surgery: Take/Use as prescribed     Restasis 0.05 % ophthalmic emulsion  Generic drug: cycloSPORINE  Medication Adjustments for Surgery: Take/Use as prescribed     turmeric-ging-olive-oreg-capry 100 mg-150 mg- 50 mg-150 mg capsule  Additional Medication Adjustments for Surgery: Take last dose 7 days before surgery  Notes to patient: Last dose 1/13/25            If no issues with your liver you may take Tylenol 2-500 mg tablets every 8 hrs around the clock for pain including morning of surgery with a sip of water    STOP VITAMINS, SUPPLEMENTS, HERBS, PROBIOTICS, AND FISH OIL, NO MOTRIN OR ADVIL OR ALEVE 7 DAYS BEFORE SURGERY    IF YOU HAVE A CPAP MACHINE BRING ON DAY OF SURGERY     CONTACT SURGEON'S OFFICE IF YOU DEVELOP:  * Fever = 100.4 F   * New respiratory symptoms (e.g. cough, shortness of breath, respiratory distress, sore throat)  * Recent loss of taste or smell  *Flu like symptoms such as headache, fatigue or gastrointestinal symptoms  * You develop any open sores, shingles, burning or painful urination   AND/OR:  * You no longer wish to have the surgery.  * Any other personal circumstances change that may lead to the  need to cancel or defer this surgery.  *You were admitted to any hospital within one week of your planned procedure.     SMOKING:  *Quitting smoking can make a huge difference to your health and recovery from surgery.    *If you need help with quitting, call 0-182-QUIT-NOW.     Additional Instructions:      Seven/Six Days before Surgery:  Review your medication instructions, stop indicated medications  Five Days before Surgery:  Review your medication instructions, stop indicated medications  Begin using your Hibiclens, if prescribed  Three Days before Surgery:  Review your medication instructions, stop indicated medications  The Day before Surgery:  Start using 0.12% CHG mouthwash-if prescribed  No smoking or alcohol use 24 hours before surgery  Review your medication instructions, stop indicated medications  You will be contacted regarding the time of your arrival to facility and surgery time  Do not eat any food after Midnight  Day of Surgery:  Review your medication instructions, take indicated medications  If you have diabetes, please check your fasting blood sugar upon awakening.  If fasting blood sugar is <80 mg/dl, drink 100 ml of apple juice, time limit of 2 hours before     SURGICAL TIME:  *You will be contacted between 2 p.m. and 3 p.m. the business day before your surgery with your arrival time.  *If you haven't received a call by 3pm, call your surgeons office  *Scheduled surgery times may change and you will be notified if this occurs-check your personal voicemail for any updates.     ON THE MORNING OF SURGERY:  *Wear comfortable, loose fitting clothing.   *Do not use moisturizers, creams, lotions or perfume.  *All jewelry and valuables should be left at home.  *Prosthetic devices such as contact lenses, hearing aids, dentures, eyelash extensions, hairpins and body piercing must be removed before surgery.     BRING WITH YOU:  *Photo ID and insurance card  *Current list of medications and  allergies  *Pacemaker/Defibrillator/Heart stent cards  *CPAP machine and mask  *Slings/splints/crutches  *Copy of your complete Advanced Directive/DHPOA-if applicable  *Neurostimulator implant remote     PARKING AND ARRIVAL:  *Check in at the Main Entrance desk and let them know you are here for surgery.     IF YOU ARE HAVING OUTPATIENT/SAME DAY SURGERY:  *A responsible adult MUST accompany you at the time of discharge and stay with you for 24 hours after your surgery.  *You may NOT drive yourself home after surgery.  *You may use a taxi or ride sharing service (Lyft, Uber) to return home ONLY if you are accompanied by a friend or family member.  *Instructions for resuming your medications will be provided by your surgeon.    Preoperative Fasting Guidelines     When do I need to stop eating before my surgery?  Do not eat any food after midnight the night before your surgery/procedure.    You may have up to 12 ounces of clear liquid until TWO hours before your instructed arrival time to the hospital.  This includes water, black tea/coffee, (no milk or cream) apple juice, and electrolyte drinks (Gatorade)  You may chew gum until TWO hours before your surgery/procedure    Preoperative Brain Exercises     What are brain exercises?  A brain exercise is any activity that engages your thinking (cognitive) skills.     What types of activities are considered brain exercises?  Jigsaw puzzles, crossword puzzles, word jumble, memory games, word search, and many more.  Many can be found free online or on your phone via a mobile walter.     Why should I do brain exercises before my surgery?  More recent research has shown brain exercise before surgery can lower the risk of postoperative delirium (confusion) which can be especially important for older adults.  Patients who did brain exercises for 5 to 10 hours the days before surgery, cut their risk of postoperative delirium in half up to 1 week after surgery.                 [facility]   Preoperative Deep Breathing Exercises     Why it is important to do deep breathing exercises before my surgery?  Deep breathing exercises strengthen your breathing muscles.  This helps you to recover after your surgery and decreases the chance of breathing complications.        How are the deep breathing exercises done?  Sit straight with your back supported.  Breathe in deeply and slowly through your nose. Your lower rib cage should expand and your abdomen may move forward.  Hold that breath for 3 to 5 seconds.  Breathe out through pursed lips, slowly and completely.  Rest and repeat 10 times every hour while awake.  Rest longer if you become dizzy or lightheaded.                [facility]   Preoperative Deep Breathing Exercises     Why it is important to do deep breathing exercises before my surgery?  Deep breathing exercises strengthen your breathing muscles.  This helps you to recover after your surgery and decreases the chance of breathing complications.        How are the deep breathing exercises done?  Sit straight with your back supported.  Breathe in deeply and slowly through your nose. Your lower rib cage should expand and your abdomen may move forward.  Hold that breath for 3 to 5 seconds.  Breathe out through pursed lips, slowly and completely.  Rest and repeat 10 times every hour while awake.  Rest longer if you become dizzy or lightheaded.        Patient Information: Incentive Spirometer  What is an incentive spirometer?  An incentive spirometer is a device used before and after surgery to “exercise” your lungs.  It helps you to take deeper breaths to expand your lungs.  Below is an example of a basic incentive spirometer.  The device you receive may differ slightly but they all function the same.    Why do I need to use an incentive spirometer?  Using your incentive spirometer prepares your lungs for surgery and helps prevent lung problems  after surgery.  How do I use my incentive spirometer?  When you're using your incentive spirometer, make sure to breathe through your mouth. If you breathe through your nose, the incentive spirometer won't work properly. You can hold your nose if you have trouble.  If you feel dizzy at any time, stop and rest. Try again at a later time.  Follow the steps below:  Set up your incentive spirometer, expand the flexible tubing and connect to the outlet.  Sit upright in a chair or bed. Hold the incentive spirometer at eye level.   Put the mouthpiece in your mouth and close your lips tightly around it. Slowly breathe out (exhale) completely.  Breathe in (inhale) slowly through your mouth as deeply as you can. As you take a breath, you will see the piston rise inside the large column. While the piston rises, the indicator should move upwards. It should stay in between the 2 arrows (see Figure).  Try to get the piston as high as you can, while keeping the indicator between the arrows.   If the indicator doesn't stay between the arrows, you're breathing either too fast or too slow.  When you get it as high as you can, hold your breath for 10 seconds, or as long as possible. While you're holding your breath, the piston will slowly fall to the base of the spirometer.  Once the piston reaches the bottom of the spirometer, breathe out slowly through your mouth. Rest for a few seconds.  Repeat 10 times. Try to get the piston to the same level with each breath.  Repeat every hour while awake  You can carefully clean the outside of the mouthpiece with an alcohol wipe or soap and water.       Patient and Family Education        Ways You Can Help Prevent Blood Clots                 This handout explains some simple things you can do to help prevent blood clots.      Blood clots are blockages that can form in the body's veins. When a blood clot forms in your deep veins, it may be called a deep vein thrombosis, or DVT for short. Blood  clots can happen in any part of the body where blood flows, but they are most common in the arms and legs. If a piece of a blood clot breaks free and travels to the lungs, it is called a pulmonary embolus (PE). A PE can be a very serious problem.       Being in the hospital or having surgery can raise your chances of getting a blood clot because you may not be well enough to move around as much as you normally do.         Ways you can help prevent blood clots in the hospital           Wearing SCDs. SCDs stands for Sequential Compression Devices.   SCDs are special sleeves that wrap around your legs  They attach to a pump that fills them with air to gently squeeze your legs every few minutes.   This helps return the blood in your legs to your heart.   SCDs should only be taken off when walking or bathing.   SCDs may not be comfortable, but they can help save your life.                                            Wearing compression stockings - if your doctor orders them. These special snug fitting stockings gently squeeze your legs to help blood flow.       Walking. Walking helps move the blood in your legs.   If your doctor says it is ok, try walking the halls at least   5 times a day. Ask us to help you get up, so you don't fall.      Taking any blood thinning medicines your doctor orders.        Page 1 of 2            [facility]; 3/23   Ways you can help prevent blood clots at home         Wearing compression stockings - if your doctor orders them. ? Walking - to help move the blood in your legs.       Taking any blood thinning medicines your doctor orders.      Signs of a blood clot or PE        Tell your doctor or nurse know right away if you have of the problems listed below.    If you are at home, seek medical care right away. Call 911 for chest pain or problems breathing.                Signs of a blood clot (DVT) - such as pain,  swelling, redness or warmth in your arm or leg      Signs of a  pulmonary embolism (PE) - such as chest pain or feeling short of breath       Thank you for coming to Pre Admission testing.      If I have prescribe medication please don't forget to  at your pharmacy.      Any questions about today's visit call [phone] and leave a message in the general mailbox.     Patient instructed to ambulate as soon as possible postoperatively to decrease thromboembolic risk.     Yuliana Garcia, DARLING-CNP     Thank you for visiting [facility].  If you have any changes to your health condition, please call the surgeons office to alert them and give them details of your symptoms.

## 2025-01-21 ENCOUNTER — ANESTHESIA (OUTPATIENT)
Dept: OPERATING ROOM | Facility: HOSPITAL | Age: 45
End: 2025-01-21
Payer: COMMERCIAL

## 2025-01-21 ENCOUNTER — ANESTHESIA EVENT (OUTPATIENT)
Dept: OPERATING ROOM | Facility: HOSPITAL | Age: 45
End: 2025-01-21
Payer: COMMERCIAL

## 2025-01-21 ENCOUNTER — HOSPITAL ENCOUNTER (OUTPATIENT)
Facility: HOSPITAL | Age: 45
Setting detail: OUTPATIENT SURGERY
Discharge: HOME | End: 2025-01-21
Attending: ORTHOPAEDIC SURGERY | Admitting: ORTHOPAEDIC SURGERY
Payer: COMMERCIAL

## 2025-01-21 VITALS
RESPIRATION RATE: 16 BRPM | HEIGHT: 74 IN | HEART RATE: 91 BPM | BODY MASS INDEX: 24.11 KG/M2 | WEIGHT: 187.83 LBS | DIASTOLIC BLOOD PRESSURE: 80 MMHG | TEMPERATURE: 98.6 F | OXYGEN SATURATION: 99 % | SYSTOLIC BLOOD PRESSURE: 139 MMHG

## 2025-01-21 DIAGNOSIS — S43.432A SUPERIOR GLENOID LABRUM LESION OF LEFT SHOULDER, INITIAL ENCOUNTER: Primary | ICD-10-CM

## 2025-01-21 PROBLEM — I71.9 AORTIC ANEURYSM (CMS-HCC): Status: ACTIVE | Noted: 2025-01-21

## 2025-01-21 PROCEDURE — 2500000005 HC RX 250 GENERAL PHARMACY W/O HCPCS: Performed by: ORTHOPAEDIC SURGERY

## 2025-01-21 PROCEDURE — 7100000009 HC PHASE TWO TIME - INITIAL BASE CHARGE: Performed by: ORTHOPAEDIC SURGERY

## 2025-01-21 PROCEDURE — 2500000004 HC RX 250 GENERAL PHARMACY W/ HCPCS (ALT 636 FOR OP/ED): Performed by: ORTHOPAEDIC SURGERY

## 2025-01-21 PROCEDURE — 2780000003 HC OR 278 NO HCPCS: Performed by: ORTHOPAEDIC SURGERY

## 2025-01-21 PROCEDURE — 7100000002 HC RECOVERY ROOM TIME - EACH INCREMENTAL 1 MINUTE: Performed by: ORTHOPAEDIC SURGERY

## 2025-01-21 PROCEDURE — 29827 SHO ARTHRS SRG RT8TR CUF RPR: CPT | Performed by: ORTHOPAEDIC SURGERY

## 2025-01-21 PROCEDURE — 3700000002 HC GENERAL ANESTHESIA TIME - EACH INCREMENTAL 1 MINUTE: Performed by: ORTHOPAEDIC SURGERY

## 2025-01-21 PROCEDURE — 2720000007 HC OR 272 NO HCPCS: Performed by: ORTHOPAEDIC SURGERY

## 2025-01-21 PROCEDURE — 29825 SHO ARTHRS SRG LSS&RESCJ ADS: CPT | Performed by: ORTHOPAEDIC SURGERY

## 2025-01-21 PROCEDURE — C1763 CONN TISS, NON-HUMAN: HCPCS | Performed by: ORTHOPAEDIC SURGERY

## 2025-01-21 PROCEDURE — 3700000001 HC GENERAL ANESTHESIA TIME - INITIAL BASE CHARGE: Performed by: ORTHOPAEDIC SURGERY

## 2025-01-21 PROCEDURE — 2500000004 HC RX 250 GENERAL PHARMACY W/ HCPCS (ALT 636 FOR OP/ED): Performed by: NURSE ANESTHETIST, CERTIFIED REGISTERED

## 2025-01-21 PROCEDURE — 3600000008 HC OR TIME - EACH INCREMENTAL 1 MINUTE - PROCEDURE LEVEL THREE: Performed by: ORTHOPAEDIC SURGERY

## 2025-01-21 PROCEDURE — 2500000005 HC RX 250 GENERAL PHARMACY W/O HCPCS: Performed by: NURSE ANESTHETIST, CERTIFIED REGISTERED

## 2025-01-21 PROCEDURE — 29826 SHO ARTHRS SRG DECOMPRESSION: CPT | Performed by: ORTHOPAEDIC SURGERY

## 2025-01-21 PROCEDURE — 7100000001 HC RECOVERY ROOM TIME - INITIAL BASE CHARGE: Performed by: ORTHOPAEDIC SURGERY

## 2025-01-21 PROCEDURE — 3600000003 HC OR TIME - INITIAL BASE CHARGE - PROCEDURE LEVEL THREE: Performed by: ORTHOPAEDIC SURGERY

## 2025-01-21 PROCEDURE — 7100000010 HC PHASE TWO TIME - EACH INCREMENTAL 1 MINUTE: Performed by: ORTHOPAEDIC SURGERY

## 2025-01-21 PROCEDURE — C1713 ANCHOR/SCREW BN/BN,TIS/BN: HCPCS | Performed by: ORTHOPAEDIC SURGERY

## 2025-01-21 DEVICE — DELIVERY SYSTEM, ARTHROSCOPIC, BIO INDUCTIVE, LARGE: Type: IMPLANTABLE DEVICE | Site: SHOULDER | Status: FUNCTIONAL

## 2025-01-21 DEVICE — BONE ANCHORS 3 WITH ARTHROSCOPIC DELIVERY SYSTEM ADVANCED
Type: IMPLANTABLE DEVICE | Site: SHOULDER | Status: FUNCTIONAL
Brand: BONE ANCHORS WITH ARTHROSCOPIC DELIVERY SYSTEM - ADVANCED

## 2025-01-21 DEVICE — IMPLANTABLE DEVICE
Type: IMPLANTABLE DEVICE | Site: SHOULDER | Status: FUNCTIONAL
Brand: ROTATION MEDICAL TENDON STAPLES

## 2025-01-21 RX ORDER — LABETALOL HYDROCHLORIDE 5 MG/ML
5 INJECTION, SOLUTION INTRAVENOUS ONCE AS NEEDED
Status: DISCONTINUED | OUTPATIENT
Start: 2025-01-21 | End: 2025-01-21 | Stop reason: HOSPADM

## 2025-01-21 RX ORDER — ALBUTEROL SULFATE 0.83 MG/ML
2.5 SOLUTION RESPIRATORY (INHALATION) ONCE AS NEEDED
Status: DISCONTINUED | OUTPATIENT
Start: 2025-01-21 | End: 2025-01-21 | Stop reason: HOSPADM

## 2025-01-21 RX ORDER — IPRATROPIUM BROMIDE 0.5 MG/2.5ML
500 SOLUTION RESPIRATORY (INHALATION) ONCE AS NEEDED
Status: DISCONTINUED | OUTPATIENT
Start: 2025-01-21 | End: 2025-01-21 | Stop reason: HOSPADM

## 2025-01-21 RX ORDER — ONDANSETRON HYDROCHLORIDE 2 MG/ML
INJECTION, SOLUTION INTRAVENOUS AS NEEDED
Status: DISCONTINUED | OUTPATIENT
Start: 2025-01-21 | End: 2025-01-21

## 2025-01-21 RX ORDER — ONDANSETRON 4 MG/1
4 TABLET, FILM COATED ORAL EVERY 8 HOURS PRN
Qty: 30 TABLET | Refills: 0 | Status: SHIPPED | OUTPATIENT
Start: 2025-01-21 | End: 2025-01-31

## 2025-01-21 RX ORDER — PHENYLEPHRINE HCL IN 0.9% NACL 1 MG/10 ML
SYRINGE (ML) INTRAVENOUS AS NEEDED
Status: DISCONTINUED | OUTPATIENT
Start: 2025-01-21 | End: 2025-01-21

## 2025-01-21 RX ORDER — PROPOFOL 10 MG/ML
INJECTION, EMULSION INTRAVENOUS AS NEEDED
Status: DISCONTINUED | OUTPATIENT
Start: 2025-01-21 | End: 2025-01-21

## 2025-01-21 RX ORDER — LIDOCAINE HYDROCHLORIDE 10 MG/ML
INJECTION, SOLUTION EPIDURAL; INFILTRATION; INTRACAUDAL; PERINEURAL AS NEEDED
Status: DISCONTINUED | OUTPATIENT
Start: 2025-01-21 | End: 2025-01-21

## 2025-01-21 RX ORDER — CEFAZOLIN SODIUM 2 G/100ML
INJECTION, SOLUTION INTRAVENOUS AS NEEDED
Status: DISCONTINUED | OUTPATIENT
Start: 2025-01-21 | End: 2025-01-21

## 2025-01-21 RX ORDER — SODIUM CHLORIDE, SODIUM LACTATE, POTASSIUM CHLORIDE, CALCIUM CHLORIDE 600; 310; 30; 20 MG/100ML; MG/100ML; MG/100ML; MG/100ML
100 INJECTION, SOLUTION INTRAVENOUS CONTINUOUS
Status: DISCONTINUED | OUTPATIENT
Start: 2025-01-21 | End: 2025-01-21 | Stop reason: HOSPADM

## 2025-01-21 RX ORDER — HYDROMORPHONE HYDROCHLORIDE 2 MG/ML
INJECTION, SOLUTION INTRAMUSCULAR; INTRAVENOUS; SUBCUTANEOUS AS NEEDED
Status: DISCONTINUED | OUTPATIENT
Start: 2025-01-21 | End: 2025-01-21

## 2025-01-21 RX ORDER — METHOCARBAMOL 750 MG/1
750 TABLET, FILM COATED ORAL 3 TIMES DAILY
Qty: 30 TABLET | Refills: 0 | Status: SHIPPED | OUTPATIENT
Start: 2025-01-21 | End: 2025-01-31

## 2025-01-21 RX ORDER — ONDANSETRON HYDROCHLORIDE 2 MG/ML
4 INJECTION, SOLUTION INTRAVENOUS ONCE AS NEEDED
Status: DISCONTINUED | OUTPATIENT
Start: 2025-01-21 | End: 2025-01-21 | Stop reason: HOSPADM

## 2025-01-21 RX ORDER — NAPROXEN SODIUM 220 MG/1
81 TABLET, FILM COATED ORAL 2 TIMES DAILY
Qty: 28 TABLET | Refills: 0 | Status: SHIPPED | OUTPATIENT
Start: 2025-01-21 | End: 2025-02-04

## 2025-01-21 RX ORDER — BUPIVACAINE HYDROCHLORIDE 5 MG/ML
INJECTION, SOLUTION PERINEURAL AS NEEDED
Status: DISCONTINUED | OUTPATIENT
Start: 2025-01-21 | End: 2025-01-21 | Stop reason: HOSPADM

## 2025-01-21 RX ORDER — OXYCODONE AND ACETAMINOPHEN 5; 325 MG/1; MG/1
1 TABLET ORAL SEE ADMIN INSTRUCTIONS
Qty: 16 TABLET | Refills: 0 | Status: SHIPPED | OUTPATIENT
Start: 2025-01-21 | End: 2025-01-25

## 2025-01-21 RX ORDER — FENTANYL CITRATE 50 UG/ML
INJECTION, SOLUTION INTRAMUSCULAR; INTRAVENOUS AS NEEDED
Status: DISCONTINUED | OUTPATIENT
Start: 2025-01-21 | End: 2025-01-21

## 2025-01-21 RX ORDER — NORETHINDRONE AND ETHINYL ESTRADIOL 0.5-0.035
KIT ORAL AS NEEDED
Status: DISCONTINUED | OUTPATIENT
Start: 2025-01-21 | End: 2025-01-21

## 2025-01-21 RX ORDER — HYDROMORPHONE HYDROCHLORIDE 0.2 MG/ML
0.2 INJECTION INTRAMUSCULAR; INTRAVENOUS; SUBCUTANEOUS EVERY 5 MIN PRN
Status: DISCONTINUED | OUTPATIENT
Start: 2025-01-21 | End: 2025-01-21 | Stop reason: HOSPADM

## 2025-01-21 RX ORDER — LIDOCAINE HYDROCHLORIDE AND EPINEPHRINE 10; 20 UG/ML; MG/ML
INJECTION, SOLUTION INFILTRATION; PERINEURAL AS NEEDED
Status: DISCONTINUED | OUTPATIENT
Start: 2025-01-21 | End: 2025-01-21 | Stop reason: HOSPADM

## 2025-01-21 RX ORDER — ESMOLOL HYDROCHLORIDE 10 MG/ML
INJECTION INTRAVENOUS AS NEEDED
Status: DISCONTINUED | OUTPATIENT
Start: 2025-01-21 | End: 2025-01-21

## 2025-01-21 RX ORDER — ROCURONIUM BROMIDE 10 MG/ML
INJECTION, SOLUTION INTRAVENOUS AS NEEDED
Status: DISCONTINUED | OUTPATIENT
Start: 2025-01-21 | End: 2025-01-21

## 2025-01-21 RX ORDER — MEPERIDINE HYDROCHLORIDE 25 MG/ML
12.5 INJECTION INTRAMUSCULAR; INTRAVENOUS; SUBCUTANEOUS EVERY 10 MIN PRN
Status: DISCONTINUED | OUTPATIENT
Start: 2025-01-21 | End: 2025-01-21 | Stop reason: HOSPADM

## 2025-01-21 RX ORDER — MIDAZOLAM HYDROCHLORIDE 1 MG/ML
INJECTION, SOLUTION INTRAMUSCULAR; INTRAVENOUS AS NEEDED
Status: DISCONTINUED | OUTPATIENT
Start: 2025-01-21 | End: 2025-01-21

## 2025-01-21 RX ADMIN — FENTANYL CITRATE 50 MCG: 50 INJECTION INTRAMUSCULAR; INTRAVENOUS at 13:06

## 2025-01-21 RX ADMIN — ROCURONIUM BROMIDE 20 MG: 10 INJECTION, SOLUTION INTRAVENOUS at 12:30

## 2025-01-21 RX ADMIN — FENTANYL CITRATE 50 MCG: 50 INJECTION INTRAMUSCULAR; INTRAVENOUS at 12:17

## 2025-01-21 RX ADMIN — Medication 100 MCG: at 12:32

## 2025-01-21 RX ADMIN — DEXAMETHASONE SODIUM PHOSPHATE 10 MG: 4 INJECTION, SOLUTION INTRAMUSCULAR; INTRAVENOUS at 12:30

## 2025-01-21 RX ADMIN — PROPOFOL 50 MG: 10 INJECTION, EMULSION INTRAVENOUS at 13:35

## 2025-01-21 RX ADMIN — ROCURONIUM BROMIDE 10 MG: 10 INJECTION, SOLUTION INTRAVENOUS at 13:08

## 2025-01-21 RX ADMIN — SODIUM CHLORIDE, POTASSIUM CHLORIDE, SODIUM LACTATE AND CALCIUM CHLORIDE: 600; 310; 30; 20 INJECTION, SOLUTION INTRAVENOUS at 12:12

## 2025-01-21 RX ADMIN — MIDAZOLAM 2 MG: 1 INJECTION INTRAMUSCULAR; INTRAVENOUS at 12:12

## 2025-01-21 RX ADMIN — FENTANYL CITRATE 50 MCG: 50 INJECTION INTRAMUSCULAR; INTRAVENOUS at 12:12

## 2025-01-21 RX ADMIN — ROCURONIUM BROMIDE 50 MG: 10 INJECTION, SOLUTION INTRAVENOUS at 12:18

## 2025-01-21 RX ADMIN — CEFAZOLIN SODIUM 2 G: 2 INJECTION, SOLUTION INTRAVENOUS at 12:24

## 2025-01-21 RX ADMIN — ONDANSETRON 4 MG: 2 INJECTION, SOLUTION INTRAMUSCULAR; INTRAVENOUS at 12:30

## 2025-01-21 RX ADMIN — HYDROMORPHONE HYDROCHLORIDE 1 MG: 2 INJECTION, SOLUTION INTRAMUSCULAR; INTRAVENOUS; SUBCUTANEOUS at 12:41

## 2025-01-21 RX ADMIN — FENTANYL CITRATE 50 MCG: 50 INJECTION INTRAMUSCULAR; INTRAVENOUS at 13:01

## 2025-01-21 RX ADMIN — HYDROMORPHONE HYDROCHLORIDE 1 MG: 2 INJECTION, SOLUTION INTRAMUSCULAR; INTRAVENOUS; SUBCUTANEOUS at 12:44

## 2025-01-21 RX ADMIN — SUGAMMADEX 200 MG: 100 INJECTION, SOLUTION INTRAVENOUS at 13:41

## 2025-01-21 RX ADMIN — Medication 100 MCG: at 13:28

## 2025-01-21 RX ADMIN — EPHEDRINE SULFATE 10 MG: 50 INJECTION, SOLUTION INTRAVENOUS at 12:36

## 2025-01-21 RX ADMIN — Medication 150 MCG: at 12:34

## 2025-01-21 RX ADMIN — Medication 100 MCG: at 12:25

## 2025-01-21 RX ADMIN — LIDOCAINE HYDROCHLORIDE 5 ML: 10 INJECTION, SOLUTION EPIDURAL; INFILTRATION; INTRACAUDAL; PERINEURAL at 12:18

## 2025-01-21 RX ADMIN — ROCURONIUM BROMIDE 10 MG: 10 INJECTION, SOLUTION INTRAVENOUS at 12:52

## 2025-01-21 RX ADMIN — PROPOFOL 50 MCG/KG/MIN: 10 INJECTION, EMULSION INTRAVENOUS at 12:29

## 2025-01-21 RX ADMIN — EPHEDRINE SULFATE 15 MG: 50 INJECTION, SOLUTION INTRAVENOUS at 12:38

## 2025-01-21 RX ADMIN — PROPOFOL 200 MG: 10 INJECTION, EMULSION INTRAVENOUS at 12:18

## 2025-01-21 RX ADMIN — ESMOLOL HYDROCHLORIDE 30 MG: 100 INJECTION, SOLUTION INTRAVENOUS at 13:13

## 2025-01-21 SDOH — HEALTH STABILITY: MENTAL HEALTH: CURRENT SMOKER: 0

## 2025-01-21 ASSESSMENT — PAIN - FUNCTIONAL ASSESSMENT
PAIN_FUNCTIONAL_ASSESSMENT: 0-10

## 2025-01-21 ASSESSMENT — PAIN SCALES - GENERAL
PAINLEVEL_OUTOF10: 0 - NO PAIN
PAIN_LEVEL: 2
PAINLEVEL_OUTOF10: 0 - NO PAIN
PAINLEVEL_OUTOF10: 1
PAINLEVEL_OUTOF10: 0 - NO PAIN
PAINLEVEL_OUTOF10: 0 - NO PAIN
PAINLEVEL_OUTOF10: 1
PAINLEVEL_OUTOF10: 0 - NO PAIN

## 2025-01-21 ASSESSMENT — COLUMBIA-SUICIDE SEVERITY RATING SCALE - C-SSRS
2. HAVE YOU ACTUALLY HAD ANY THOUGHTS OF KILLING YOURSELF?: NO
1. IN THE PAST MONTH, HAVE YOU WISHED YOU WERE DEAD OR WISHED YOU COULD GO TO SLEEP AND NOT WAKE UP?: NO

## 2025-01-21 NOTE — ANESTHESIA POSTPROCEDURE EVALUATION
Patient: Roney Malik    Procedure Summary       Date: 01/21/25 Room / Location: [facility] / [facility]    Anesthesia Start: 1212 Anesthesia Stop: 1357    Procedure: Shoulder scope, revision subacromial decompression, labral debridement, biological augmentation RTC (  Beachchair ) (Left: Shoulder) Diagnosis:       Superior glenoid labrum lesion of left shoulder, initial encounter      Biceps tendonosis of left shoulder      (Superior glenoid labrum lesion of left shoulder, initial encounter [S43.432A])      (Biceps tendonosis of left shoulder [M67.814])    Surgeons: Agustin Vasquez MD Responsible Provider: Lexy Landeros MD MPH    Anesthesia Type: general ASA Status: 2            Anesthesia Type: general    Vitals Value Taken Time   /98 01/21/25 1357   Temp 36 01/21/25 1357   Pulse 93 01/21/25 1357   Resp 14 01/21/25 1357   SpO2 97 01/21/25 1357       Anesthesia Post Evaluation    Patient location during evaluation: PACU  Patient participation: complete - patient participated  Level of consciousness: awake and alert  Pain score: 2  Pain management: adequate  Multimodal analgesia pain management approach  Airway patency: patent  Two or more strategies used to mitigate risk of obstructive sleep apnea  Cardiovascular status: acceptable and hemodynamically stable  Respiratory status: acceptable and spontaneous ventilation  Hydration status: acceptable  Postoperative Nausea and Vomiting: none    There were no known notable events for this encounter.

## 2025-01-21 NOTE — ANESTHESIA PREPROCEDURE EVALUATION
Patient: Roney Malik    Procedure Information       Date/Time: 01/21/25 1145    Procedure: Shoulder scope, revision subacromial decompression, labral debridement, biological augmentation RTC (  Beachchair ) (Left: Shoulder)    Location: [facility]    Surgeons: Agustin Vasquez MD            Relevant Problems   Anesthesia (within normal limits)      Cardiac   (+) Aortic aneurysm (CMS-HCC) (He is being followed at [facility] for mild ectasia of aortic root, stable per MRI 2022  )      Pulmonary (within normal limits)      Neuro (within normal limits)      GI (within normal limits)      /Renal (within normal limits)      Liver (within normal limits)      Endocrine (within normal limits)      Hematology (within normal limits)      Musculoskeletal  Raynaud's  L shoulder  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      HEENT (within normal limits)      ID (within normal limits)      Skin (within normal limits)      GYN (within normal limits)     Past Surgical History:   Procedure Laterality Date    ANTERIOR CRUCIATE LIGAMENT REPAIR      MICRODISCECTOMY LUMBAR      ROTATOR CUFF REPAIR     Mult shoulder surgeries bilat    Clinical information reviewed:    Allergies                NPO Detail:  NPO/Void Status  Time of Last Void: 1030         Physical Exam    Airway  Mallampati: I  TM distance: >3 FB  Neck ROM: full     Cardiovascular    Dental    Pulmonary    Abdominal          Lab Results   Component Value Date    WBC 5.7 01/07/2025    HGB 14.9 01/07/2025    HCT 44.8 01/07/2025    MCV 92 01/07/2025     01/07/2025       Chemistry    Lab Results   Component Value Date/Time     01/07/2025 1537    K 4.2 01/07/2025 1537     01/07/2025 1537    CO2 27 01/07/2025 1537    BUN 26 (H) 01/07/2025 1537    CREATININE 1.03 01/07/2025 1537    Lab Results   Component Value Date/Time    CALCIUM 10.1 01/07/2025 1537    ALKPHOS 58 08/09/2023 0958    AST 15 08/09/2023 0958    ALT 12 08/09/2023 0958    BILITOT  0.5 08/09/2023 0958          Anesthesia Plan    History of general anesthesia?: yes  History of complications of general anesthesia?: no    ASA 2     general   (Pt refusing nerve block)  The patient is not a current smoker.  Patient was not previously instructed to abstain from smoking on day of procedure.  Patient did not smoke on day of procedure.  Education provided regarding risk of obstructive sleep apnea.  intravenous induction   Postoperative administration of opioids is intended.  Trial extubation is planned.  Anesthetic plan and risks discussed with patient.  Use of blood products discussed with patient who consented to blood products.    Plan discussed with CRNA and CAA.

## 2025-01-21 NOTE — ANESTHESIA PROCEDURE NOTES
Airway  Date/Time: 1/21/2025 12:20 PM  Urgency: elective    Airway not difficult    Staffing  Performed: CRNA   Authorized by: Lexy Landeros MD MPH    Performed by: DARLING Shetty-CRNA, MIMI  Patient location during procedure: OR    Indications and Patient Condition  Indications for airway management: anesthesia and airway protection  Spontaneous ventilation: present  Sedation level: deep  Preoxygenated: yes  Patient position: sniffing  Mask difficulty assessment: 1 - vent by mask    Final Airway Details  Final airway type: endotracheal airway      Successful airway: ETT  Cuffed: yes   Successful intubation technique: direct laryngoscopy  Facilitating devices/methods: intubating stylet  Endotracheal tube insertion site: oral  Blade: [place]vall[place]  Blade size: #2  ETT size (mm): 7.5  Cormack-Lehane Classification: grade IIa - partial view of glottis  Placement verified by: capnometry   Measured from: lips  ETT to lips (cm): 22  Number of attempts at approach: 1

## 2025-01-21 NOTE — BRIEF OP NOTE
Date: 2025  OR Location: [facility]    Name: Roney Malik, : 1980, Age: 44 y.o., MRN: [id number], Sex: male    Diagnosis  Pre-op Diagnosis      * Superior glenoid labrum lesion of left shoulder, initial encounter [S43.432A]     * Biceps tendonosis of left shoulder [M67.814] Post-op Diagnosis     * Superior glenoid labrum lesion of left shoulder, initial encounter [S43.432A]     * Biceps tendonosis of left shoulder [M67.814]     Procedures  Shoulder scope, revision subacromial decompression, labral debridement, biological augmentation RTC (  Beachchair )  72780 - KS SURGICAL ARTHROSCOPY GENIA W/CORACOACRM LIGM RLS    KS SURGICAL ARTHROSCOPY SHOULDER LMTD DBRDMT / [57223]  KS TENODESIS LONG TENDON BICEPS [37900]  Surgeons      * Agustin Vasquez - Primary    Resident/Fellow/Other Assistant:  Surgeons and Role:     * Estrella Chavarria PA-C - AZEB First Assist    Staff:   Circulator: Julia Holder Person: Patty    Anesthesia Staff: Anesthesiologist: Lexy Landeros MD MPH  CRNA: DARLING Shetty-RHEA, DNP    Procedure Summary  Anesthesia: General  ASA: II  Estimated Blood Loss: 5mL  Intra-op Medications:   Administrations occurring from 1145 to 1350 on 25:   Medication Name Total Dose   EPINEPHrine (Adrenalin) 1 mg/mL 1 mg in sodium chloride 0.9 % 3,000 mL irrigation Cannot be calculated   lidocaine-epinephrine (Xylocaine W/EPI) 2 %-1:100,000 injection 20 mL   BUPivacaine HCl (Marcaine) 0.5 % (5 mg/mL) injection 10 mL   ceFAZolin (Ancef) IV 2 g in 100 mL dextrose (iso) - premix 2 g   dexAMETHasone (Decadron) injection 4 mg/mL 10 mg   ePHEDrine injection 25 mg   esmolol (Brevibloc) injection 30 mg   fentaNYL (Sublimaze) injection 50 mcg/mL 200 mcg   HYDROmorphone (Dilaudid) injection 2 mg/mL 2 mg   LR bolus Cannot be calculated   lidocaine PF (Xylocaine-MPF) local injection 1 % 5 mL   midazolam (Versed) injection 1 mg/mL 2 mg   ondansetron (Zofran) 2 mg/mL injection 4 mg   phenylephrine 100 mcg/mL  syringe 10 mL (prefilled) 450 mcg   propofol (Diprivan) injection 10 mg/mL 522.64 mg   rocuronium (ZeMuron) 50 mg/5 mL injection 90 mg   sugammadex (Bridion) 200 mg/2 mL injection 200 mg              Anesthesia Record               Intraprocedure I/O Totals          Intake    LR bolus 1000.00 mL    Total Intake 1000 mL          Specimen: No specimens collected               Findings: partial thickness, articular sided, supraspinatus tear. subacromial scar formation. Subacromial spur.     Complications:  None; patient tolerated the procedure well.     Disposition: PACU - hemodynamically stable.  Condition: stable  Specimens Collected: No specimens collected  Attending Attestation: I was present and scrubbed for the entire procedure.    Agustin Vasquez  Phone Number: [phone]

## 2025-01-21 NOTE — DISCHARGE INSTRUCTIONS
Postoperative Instructions Subacromial Decompression, Rotator cuff patch  Diet  Begin with clear liquids and light foods (jello, soup, etc)  Progress to your normal diet if you are not nauseated    Wound Care  Maintain your operative dressing, loosen bandage if swelling occurs  It is normal for the shoulder to bleed and swell following surgery - if blood soaks on the bandage, do not become alarmed.  Reinforce with additional dressing  Removal surgical dressing on the third post-operative day. If minimal drainage is present, apply bandaids over incisions and change daily.  You can remove JULIETTE hose on the third post operative day  To avoid infection, keep incisions clean and dry.  You can shower 2 days post op.  MUST KEEP THE INCISIONS DRY.  NO immersion of the leg into a bath/pool etc.    Medications  Pain medication is injection is injected in the wound and shoulder during surgery.  This will wear off within 8-12 hours.   You may have received a nerve block prior to surgery. If so, this will wear off within 24-36 hours.  Most patients require narcotic pain medication for a short period of time after surgery.  Common side effects include nausea, drowsiness and constipation.  To decrease side effects take these medications with food. If constipation occurs, you can utilize over the counter Colace or Miralax.  If you are having problems with nausea and vomiting, contact the office to discuss.  Do not drive a car or operate machinery while taking narcotic pain medication.  Over the counter anti-inflammatories (i.e. Advil, Ibuprofen etc.) can be taken in between the narcotic pain medication if needed for pain  A baby aspirin is to be taken twice a day to prevent the risk of blood clots.    Activity  When sleeping or resting, inclined positions (i.e.) reclining chair) and a pillow under the forearm for support may provide better comfort  Do not engage in activity what will increase pain and swelling such as lifting or  repetitive motion above the shoulder, for the first 7-10 days after surgery  Avoid long periods of sitting without the leg elevated or long distance travel for 2 weeks  No driving until discussed at your first post-operative appointment  May return to sedentary work or school once you have discontinued narcotic pain medication      Immobilizer  Sling is only needed for comfort.    Ice Therapy  Begin immediately after surgery  Use ice machine continuously or ice packs every 2 hours for 20 minutes daily.  Do not place the wrap or ice packs directly onto skin.     Exercise  Begin pendulum, elbow, wrist and hand exercises 24 hours after surgery.  Complete 3-4 times per day  Formal physical therapy will begin the week following surgery    Contact information  Our office phone is [phone].  Contact the office with any of the following  Painful swelling or numbness  Unrelenting pain  Fever over 101° or chills  Redness around incision  Continuous drainage or bleeding from the incision. A small amount is to be expected)  Color change in the leg  Trouble breathing  Excessive nausea or vomiting  If you have an emergency after hours on the weekend, please call [phone] to reach the answering service who will contact Dr. Vasquez  If you have an emergency that requires immediate attention, proceed to the nearest emergency room or call [phone].    Follow up  Your first post operative appointment should be scheduled within 12-14 days after surgery. Contact the office at [phone] if this has not yet been set up.

## 2025-01-21 NOTE — INTERVAL H&P NOTE
H&P reviewed. The patient was examined and there are no changes to the H&P.    Kofi England MD  Sports Medicine Fellow  Orthopaedic Surgery

## 2025-01-22 DIAGNOSIS — M75.102 TEAR OF LEFT ROTATOR CUFF, UNSPECIFIED TEAR EXTENT, UNSPECIFIED WHETHER TRAUMATIC: Primary | ICD-10-CM

## 2025-01-22 RX ORDER — TRAMADOL HYDROCHLORIDE 50 MG/1
50 TABLET ORAL EVERY 6 HOURS PRN
Qty: 12 TABLET | Refills: 0 | Status: SHIPPED | OUTPATIENT
Start: 2025-01-22 | End: 2025-01-25

## 2025-01-30 NOTE — OP NOTE
Shoulder scope, revision subacromial decompression, labral debridement, biological augmentation RTC (  Beachchair ) (L) Operative Note     Date: 2025  OR Location: [facility]    Name: Roney Malik : 1980, Age: 44 y.o., MRN: [id number], Sex: male    Pre-op Diagnosis:  Left Shoulder Superior labral tear  Left Shoulder high grade partial thickness rotator cuff injury  Left shoulder subacromial impingment    Post-Op Diagnosis:  Left shoulder superior labral fraying with severe scarring of the anterior rotator interval  Left shoulder Partial thickness RC tear SST  Left shoulder subacromial impingement with a type III acromion    Procedures  Left shoulder arthroscopic limited intra-articular debridement  Left shoulder Lysis of adhesions arthroscopic  Left shoulder rotator cuff repair with bioinductive patch with anchors placed  Left shoulder arthroscopic subacromial decompression with formal acromioplasty  Surgeons      * Agustin Vasquez - Primary    Resident/Fellow/Other Assistant:  First assistant; Estrella Chavarria PA-C.  Please note that we will be   billing for my physician's assistant as she was critical and   necessary for successful completion of this case including limb   positioning, anchor placement, suture management, and wound closure.   There were no qualified residents available to assist    Staff:   Circulator: Julia Holder Person: Patty    Anesthesia Staff: Anesthesiologist: Lexy Landeros MD MPH  CRNA: DARLING Shetty-CRNA, DNP    Procedure Summary  Anesthesia: General  ASA: II  Estimated Blood Loss: 5 mL  Intra-op Medications:   Administrations occurring from 1145 to 1350 on 25:   Medication Name Total Dose   EPINEPHrine (Adrenalin) 1 mg/mL 1 mg in sodium chloride 0.9 % 3,000 mL irrigation Cannot be calculated   lidocaine-epinephrine (Xylocaine W/EPI) 2 %-1:100,000 injection 20 mL   BUPivacaine HCl (Marcaine) 0.5 % (5 mg/mL) injection 10 mL   ceFAZolin (Ancef) IV 2 g in 100 mL  dextrose (iso) - premix 2 g   dexAMETHasone (Decadron) injection 4 mg/mL 10 mg   ePHEDrine injection 25 mg   esmolol (Brevibloc) injection 30 mg   fentaNYL (Sublimaze) injection 50 mcg/mL 200 mcg   HYDROmorphone (Dilaudid) injection 2 mg/mL 2 mg   LR bolus Cannot be calculated   lidocaine PF (Xylocaine-MPF) local injection 1 % 5 mL   midazolam (Versed) injection 1 mg/mL 2 mg   ondansetron (Zofran) 2 mg/mL injection 4 mg   phenylephrine 100 mcg/mL syringe 10 mL (prefilled) 450 mcg   propofol (Diprivan) injection 10 mg/mL 522.64 mg   rocuronium (ZeMuron) 50 mg/5 mL injection 90 mg   sugammadex (Bridion) 200 mg/2 mL injection 200 mg              Anesthesia Record               Intraprocedure I/O Totals          Intake    LR bolus 1000.00 mL    Total Intake 1000 mL          Specimen: No specimens collected              Drains and/or Catheters: * None in log *    Tourniquet Times:         Implants:  Implants       Type Name Action Serial No.      Implant DELIVERY SYSTEM, ARTHROSCOPIC, BIO INDUCTIVE, LARGE - [id number] Implanted      Implant ANCHORS, TENDON 8 - [id number] Implanted      Implant BONE ANCHORS 3, WITH ARTHRO DELIVERY SYS ADVANCED - [id number] Implanted                 Indications: Roney Malik is an 44 y.o. male who is having surgery for persistent pain after shoulder surgery at an outside hospital and MRI showing rotator cuff pathology.  He had failed all non-surgical management    The patient was seen in the preoperative area. The risks, benefits, complications, treatment options, non-operative alternatives, expected recovery and outcomes were discussed with the patient. The possibilities of reaction to medication, pulmonary aspiration, injury to surrounding structures, bleeding, recurrent infection, the need for additional procedures, failure to diagnose a condition, and creating a complication requiring transfusion or operation were discussed with the patient. The patient concurred with the proposed  plan, giving informed consent.  The site of surgery was properly noted/marked if necessary per policy. The patient has been actively warmed in preoperative area. Preoperative antibiotics have been ordered and given within 1 hours of incision. Venous thrombosis prophylaxis have been ordered including bilateral sequential compression devices    Procedure Details:     Patient was identified in the preoperative holding area operative extremities marked indelible marker informed consent was reviewed.  He was then taken to the operating room where timeout was performed verifying correct side site and procedure.  He was placed supine on operating table all bony prominences were well-padded SCDs were placed for DVT prophylaxis antibiotics were infused intravenously.  He was then placed in the beachchair position after he was anesthetized.  He was prepped and draped in usual sterile fashion.  We began by making a standard posterior viewing portal upon entering the joint the patient was noted to have severe scarring of his anterior interval that was adherent to his subscapularis and superior labrum.  This was lysed with cautery to reestablish normal gliding planes.  A limited intra-articular debridement of the glenoid humeral head labrum and synovium was performed.  He had preserved articular cartilage.  We then identified that there was a high-grade partial undersurface tear of the leading edge of the supraspinatus that was debrided the remainder of the labrum and rotator cuff appeared to be in good condition.  We turned our attention then to the subacromial space after marking the area of maximal thinness of the rotator cuff with a PDS suture.  Upon entering the subacromial space again we encountered severe scarring this was removed in its entirety with a combination of a shaver and radiofrequency device.  Based on the patient's high-grade partial tear and his type III acromion a subacromial decompression was performed with  a formal acromioplasty.  Given the amount of fraying and his persistent pain after his prior surgery we did elect to perform a rotator cuff repair with a bio inductive patch.  The Regeneten patch was delivered through the lateral portal and then anchors were used to fixated to the rotator cuff.  A large patch was used.  We then drained the shoulder withdrew the arthroscope close the portals and sutures applied a sterile bandage and a shoulder immobilizer.  Patient was woke from anesthesia without complication transported back stable condition postoperative course be standard rotator cuff repair protocol          Complications:  None; patient tolerated the procedure well.    Disposition: PACU - hemodynamically stable.  Condition: stable         Attending Attestation: I was present and scrubbed for the entire procedure.    Agustin Vasquez  Phone Number: [phone]

## 2025-02-03 ENCOUNTER — OFFICE VISIT (OUTPATIENT)
Dept: ORTHOPEDIC SURGERY | Facility: HOSPITAL | Age: 45
End: 2025-02-03
Payer: COMMERCIAL

## 2025-02-03 DIAGNOSIS — M75.102 TEAR OF LEFT ROTATOR CUFF, UNSPECIFIED TEAR EXTENT, UNSPECIFIED WHETHER TRAUMATIC: Primary | ICD-10-CM

## 2025-02-03 PROCEDURE — 99211 OFF/OP EST MAY X REQ PHY/QHP: CPT | Performed by: PHYSICIAN ASSISTANT

## 2025-02-03 NOTE — PROGRESS NOTES
L Shoulder 1/21/25    Patient returns 2 weeks status post Left shoulder surgery RC debridement with Regeneten patch augment, SAD.   DOS1/21/25.  They are doing very well.    Incisions are clean and dry there is no evidence of erythema no drainage.    Range of motion is appropriate for this stage of the rehabilitation.  There is no evidence of upper or lower extremity DVT.       VIPIN AguirreC

## 2025-02-20 NOTE — PROGRESS NOTES
[facility]  Hand and Upper Extremity Service  Initial evaluation / Consultation           Chief Complaint: Left elbow         44 y.o right hand dominant male presenting for second opinion of left elbow pain. He had a left elbow ulnar nerve decompression with anterior subcutaneous transposition in June of 2023 by Dr. Han at the [facility]. He admits he wasn't compliant with the postoperative course and returned to heavier activities quickly. He did okay within the first couple of weeks but indicated that he started to feel his nerve was moving too much and he was having intermittent hand symptoms, particularly in the medial forearm and ring and small finger. Now he notes a very tender spot over the medial elbow and can feel the nerve moving. He also has a snapping triceps tendon over the medial epicondyle with elbow flexion. He recently saw Dr. Han who reviewed a neuromuscular ultrasound and offered a revision surgery but he's here for a second opinion regarding surgical options.          Please refer to New Patient Intake Form scanned into patient's electronic record for self reported past medical history, past surgical history, medications, allergies, family history, social history and 10 point review of systems    Examination:  Constitutional: Oriented to person, place, and time.  Appears well-developed and well-nourished.  Head: Normocephalic and atraumatic.  Eyes: Pupils are equal, round, and reactive to light.  Cardiovascular: Intact distal pulses.  Pulmonary/Chest/Breast: Effort normal. No respiratory distress.  Neurological: Alert and oriented to person, place, and time.  Skin: Skin is warm and dry.  Psychiatric: normal mood and affect.  Behavior is normal.  Musculoskeletal: Left elbow reveals healed incision about 1.5-2 inches in length over the medial epicondyle. Full elbow range of motion. Ulnar nerve is palpable just anterior to the apex of the medial  epicondyle. Positive Tinel's sign over the ulnar nerve. With elbow flexion there's evidence of snapping of the triceps tendon over the medial epicondyle. Slight numbness of the ring and small finger. No intrinsic muscle weakness, no atrophy, or clawing.        Personal Interpretation of Diagnostic studies: Review of neuromuscular ultrasound performed at the [facility] in October of 2023 reveals enlargement of ulnar nerve fascicles in transposed position without evidence of hyperemia of the ulnar nerve. Also reveals evidence of snapping of the medial triceps tendon.        Impression:  Persistent ulnar nerve symptoms following prior ulnar nerve decompression surgery       Plan: We discussed treatment options. The goal of nerve compression surgery is to take the irritation away from the nerve to stop the pressure of this condition. It's not a guarantee of resolution of any preexisting conditions given his hypermobility and concern that the nerve with simple subcutaneous transposition may continue to be mobile and unstable, I believe he'd be better served with a submuscular transposition. We discussed in detail what this would entail, particularly related to 2 weeks of long-arm immobilization. He's concerned with this because he has to wear contacts and will be unable to get his left arm to his face to assist with putting in his contacts. Following the 2 weeks, we would transition him to a wrist splint that he can remove for hygiene purposes and begin working on elbow range of motion but would have to avoid heavier lifting, gripping, or other wrist maneuvers for likely 6-8 weeks and it would be at least 4 months before we'd allow him to do any significant lifting with his left arm. All his questions were answered and I'm happy to help him if he desires but I expressed complete confidence in Dr. Han's ability to help him with this problem. He's been provided with contact information if he wants to schedule  revision left ulnar decompression with submuscular anterior transposition with triceps tendon debridement with me in the future.       Follow up: As needed       For Surgical Planning:  Diagnosis: Recurrent cubital tunnel syndrome left elbow  Procedure: Revision left elbow ulnar nerve decompression with submuscular transposition  CPT: 81571, 46595  Anesthesia: General  Duration: 90 minutes  Special Equipment Needed: None  Medical Notes / PM / DM / PAT needed?:  PAT requested  Location: Any  Initial Post Operative Visit: 2 weeks           Brock Mejia MD  [facility]  Department of Orthopaedic Surgery  Hand and Upper Extremity Reconstruction      Scribe Attestation  By signing my name below, IKatya Scribe   attest that this documentation has been prepared under the direction and in the presence of Dr. Brock Mejia.      Dictation performed with the use of voice recognition software.  Syntax and grammatical errors may exist.

## 2025-02-24 ENCOUNTER — APPOINTMENT (OUTPATIENT)
Dept: ORTHOPEDIC SURGERY | Facility: CLINIC | Age: 45
End: 2025-02-24
Payer: COMMERCIAL

## 2025-02-24 VITALS — HEIGHT: 74 IN | WEIGHT: 187 LBS | BODY MASS INDEX: 24 KG/M2

## 2025-02-24 DIAGNOSIS — M25.522 ELBOW PAIN, LEFT: ICD-10-CM

## 2025-02-24 PROCEDURE — 3008F BODY MASS INDEX DOCD: CPT | Performed by: ORTHOPAEDIC SURGERY

## 2025-02-24 PROCEDURE — 99214 OFFICE O/P EST MOD 30 MIN: CPT | Performed by: ORTHOPAEDIC SURGERY

## 2025-02-24 PROCEDURE — 1036F TOBACCO NON-USER: CPT | Performed by: ORTHOPAEDIC SURGERY

## 2025-02-24 ASSESSMENT — PAIN SCALES - GENERAL: PAINLEVEL_OUTOF10: 5 - MODERATE PAIN

## 2025-02-24 ASSESSMENT — PAIN - FUNCTIONAL ASSESSMENT: PAIN_FUNCTIONAL_ASSESSMENT: 0-10

## 2025-02-24 ASSESSMENT — PAIN DESCRIPTION - DESCRIPTORS: DESCRIPTORS: ACHING;BURNING

## 2025-02-24 NOTE — LETTER
February 24, 2025     Zaid Martinez MD  [address] [facility] [address]    Patient: Roney Malik   YOB: 1980   Date of Visit: 2/24/2025       Dear Dr. Zaid Martinez MD:    Thank you for referring Roney Malik to me for evaluation. Below are my notes for this consultation.  If you have questions, please do not hesitate to call me. I look forward to following your patient along with you.       Sincerely,     Brock Mejia MD      CC: No Recipients  ______________________________________________________________________________________    [facility]  Hand and Upper Extremity Service  Initial evaluation / Consultation           Chief Complaint: Left elbow         44 y.o right hand dominant male presenting for second opinion of left elbow pain. He had a left elbow ulnar nerve decompression with anterior subcutaneous transposition in June of 2023 by Dr. Han at the [facility]. He admits he wasn't compliant with the postoperative course and returned to heavier activities quickly. He did okay within the first couple of weeks but indicated that he started to feel his nerve was moving too much and he was having intermittent hand symptoms, particularly in the medial forearm and ring and small finger. Now he notes a very tender spot over the medial elbow and can feel the nerve moving. He also has a snapping triceps tendon over the medial epicondyle with elbow flexion. He recently saw Dr. Han who reviewed a neuromuscular ultrasound and offered a revision surgery but he's here for a second opinion regarding surgical options.          Please refer to New Patient Intake Form scanned into patient's electronic record for self reported past medical history, past surgical history, medications, allergies, family history, social history and 10 point review of systems    Examination:  Constitutional: Oriented to person, place, and time.  Appears  well-developed and well-nourished.  Head: Normocephalic and atraumatic.  Eyes: Pupils are equal, round, and reactive to light.  Cardiovascular: Intact distal pulses.  Pulmonary/Chest/Breast: Effort normal. No respiratory distress.  Neurological: Alert and oriented to person, place, and time.  Skin: Skin is warm and dry.  Psychiatric: normal mood and affect.  Behavior is normal.  Musculoskeletal: Left elbow reveals healed incision about 1.5-2 inches in length over the medial epicondyle. Full elbow range of motion. Ulnar nerve is palpable just anterior to the apex of the medial epicondyle. Positive Tinel's sign over the ulnar nerve. With elbow flexion there's evidence of snapping of the triceps tendon over the medial epicondyle. Slight numbness of the ring and small finger. No intrinsic muscle weakness, no atrophy, or clawing.        Personal Interpretation of Diagnostic studies: Review of neuromuscular ultrasound performed at the [facility] in October of 2023 reveals enlargement of ulnar nerve fascicles in transposed position without evidence of hyperemia of the ulnar nerve. Also reveals evidence of snapping of the medial triceps tendon.        Impression:  Persistent ulnar nerve symptoms following prior ulnar nerve decompression surgery       Plan: We discussed treatment options. The goal of nerve compression surgery is to take the irritation away from the nerve to stop the pressure of this condition. It's not a guarantee of resolution of any preexisting conditions given his hypermobility and concern that the nerve with simple subcutaneous transposition may continue to be mobile and unstable, I believe he'd be better served with a submuscular transposition. We discussed in detail what this would entail, particularly related to 2 weeks of long-arm immobilization. He's concerned with this because he has to wear contacts and will be unable to get his left arm to his face to assist with putting in his contacts.  Following the 2 weeks, we would transition him to a wrist splint that he can remove for hygiene purposes and begin working on elbow range of motion but would have to avoid heavier lifting, gripping, or other wrist maneuvers for likely 6-8 weeks and it would be at least 4 months before we'd allow him to do any significant lifting with his left arm. All his questions were answered and I'm happy to help him if he desires but I expressed complete confidence in Dr. Han's ability to help him with this problem. He's been provided with contact information if he wants to schedule revision left ulnar decompression with submuscular anterior transposition with triceps tendon debridement with me in the future.       Follow up: As needed       For Surgical Planning:  Diagnosis: Recurrent cubital tunnel syndrome left elbow  Procedure: Revision left elbow ulnar nerve decompression with submuscular transposition  CPT: 85884, 76593  Anesthesia: General  Duration: 90 minutes  Special Equipment Needed: None  Medical Notes / PM / DM / PAT needed?:  PAT requested  Location: Any  Initial Post Operative Visit: 2 weeks           Brock Mejia MD  [facility]  Department of Orthopaedic Surgery  Hand and Upper Extremity Reconstruction      Scribe Attestation  By signing my name below, I, Radha Sands   attest that this documentation has been prepared under the direction and in the presence of Dr. Brock Mejia.      Dictation performed with the use of voice recognition software.  Syntax and grammatical errors may exist.

## 2025-03-03 ENCOUNTER — OFFICE VISIT (OUTPATIENT)
Dept: ORTHOPEDIC SURGERY | Facility: HOSPITAL | Age: 45
End: 2025-03-03
Payer: COMMERCIAL

## 2025-03-03 DIAGNOSIS — M75.102 TEAR OF LEFT ROTATOR CUFF, UNSPECIFIED TEAR EXTENT, UNSPECIFIED WHETHER TRAUMATIC: Primary | ICD-10-CM

## 2025-03-03 PROCEDURE — 99211 OFF/OP EST MAY X REQ PHY/QHP: CPT | Performed by: PHYSICIAN ASSISTANT

## 2025-03-03 NOTE — PROGRESS NOTES
L Shoulder 1/21/25      Patient is here today 6 weeks out from his left shoulder arthroscopy, rotator cuff debridement with Regeneten patch and subacromial decompression.  He states his shoulder is feeling good.  He has near full range of motion.  Strength continues to improve.  He is having some significant issues with his left elbow and is seen Dr. Mejia and they did discuss surgery.  We discussed the need for him to continue working range of motion of the shoulder to prevent any stiffness.  He also mentions some similar pain in the right shoulder.  He has also had 2 surgeries on the right shoulder.  He will keep a close eye on this and we can certainly look into it in the future if needed.  I will see him back in 6 weeks.      Estrella Chavarria PA-C

## 2025-03-13 DIAGNOSIS — G56.22 LESION OF LEFT ULNAR NERVE: Primary | ICD-10-CM

## 2025-03-20 RX ORDER — ALPRAZOLAM 0.25 MG/1
1 TABLET ORAL
COMMUNITY
Start: 2025-01-13

## 2025-03-20 RX ORDER — CHOLECALCIFEROL (VITAMIN D3) 25 MCG
1000 TABLET ORAL DAILY
COMMUNITY

## 2025-03-27 ENCOUNTER — ANESTHESIA EVENT (OUTPATIENT)
Dept: OPERATING ROOM | Facility: CLINIC | Age: 45
End: 2025-03-27
Payer: COMMERCIAL

## 2025-03-28 ENCOUNTER — HOSPITAL ENCOUNTER (OUTPATIENT)
Facility: CLINIC | Age: 45
Setting detail: OUTPATIENT SURGERY
Discharge: HOME | End: 2025-03-28
Attending: ORTHOPAEDIC SURGERY | Admitting: ORTHOPAEDIC SURGERY
Payer: COMMERCIAL

## 2025-03-28 ENCOUNTER — ANESTHESIA (OUTPATIENT)
Dept: OPERATING ROOM | Facility: CLINIC | Age: 45
End: 2025-03-28
Payer: COMMERCIAL

## 2025-03-28 VITALS
OXYGEN SATURATION: 96 % | TEMPERATURE: 97.2 F | HEIGHT: 74 IN | DIASTOLIC BLOOD PRESSURE: 78 MMHG | HEART RATE: 89 BPM | BODY MASS INDEX: 23.29 KG/M2 | WEIGHT: 181.44 LBS | SYSTOLIC BLOOD PRESSURE: 127 MMHG | RESPIRATION RATE: 18 BRPM

## 2025-03-28 DIAGNOSIS — G56.22 LESION OF LEFT ULNAR NERVE: Primary | ICD-10-CM

## 2025-03-28 PROCEDURE — 3700000002 HC GENERAL ANESTHESIA TIME - EACH INCREMENTAL 1 MINUTE: Performed by: ORTHOPAEDIC SURGERY

## 2025-03-28 PROCEDURE — 3600000003 HC OR TIME - INITIAL BASE CHARGE - PROCEDURE LEVEL THREE: Performed by: ORTHOPAEDIC SURGERY

## 2025-03-28 PROCEDURE — 2500000004 HC RX 250 GENERAL PHARMACY W/ HCPCS (ALT 636 FOR OP/ED)

## 2025-03-28 PROCEDURE — 7100000001 HC RECOVERY ROOM TIME - INITIAL BASE CHARGE: Performed by: ORTHOPAEDIC SURGERY

## 2025-03-28 PROCEDURE — 2500000005 HC RX 250 GENERAL PHARMACY W/O HCPCS: Performed by: ORTHOPAEDIC SURGERY

## 2025-03-28 PROCEDURE — 7100000010 HC PHASE TWO TIME - EACH INCREMENTAL 1 MINUTE: Performed by: ORTHOPAEDIC SURGERY

## 2025-03-28 PROCEDURE — 7100000002 HC RECOVERY ROOM TIME - EACH INCREMENTAL 1 MINUTE: Performed by: ORTHOPAEDIC SURGERY

## 2025-03-28 PROCEDURE — 3700000001 HC GENERAL ANESTHESIA TIME - INITIAL BASE CHARGE: Performed by: ORTHOPAEDIC SURGERY

## 2025-03-28 PROCEDURE — 2500000002 HC RX 250 W HCPCS SELF ADMINISTERED DRUGS (ALT 637 FOR MEDICARE OP, ALT 636 FOR OP/ED): Performed by: ANESTHESIOLOGY

## 2025-03-28 PROCEDURE — 7100000009 HC PHASE TWO TIME - INITIAL BASE CHARGE: Performed by: ORTHOPAEDIC SURGERY

## 2025-03-28 PROCEDURE — 2500000004 HC RX 250 GENERAL PHARMACY W/ HCPCS (ALT 636 FOR OP/ED): Performed by: ANESTHESIOLOGY

## 2025-03-28 PROCEDURE — 2500000004 HC RX 250 GENERAL PHARMACY W/ HCPCS (ALT 636 FOR OP/ED): Performed by: ORTHOPAEDIC SURGERY

## 2025-03-28 PROCEDURE — 3600000008 HC OR TIME - EACH INCREMENTAL 1 MINUTE - PROCEDURE LEVEL THREE: Performed by: ORTHOPAEDIC SURGERY

## 2025-03-28 PROCEDURE — 2720000007 HC OR 272 NO HCPCS: Performed by: ORTHOPAEDIC SURGERY

## 2025-03-28 RX ORDER — OXYCODONE HYDROCHLORIDE 5 MG/1
10 TABLET ORAL EVERY 4 HOURS PRN
Status: DISCONTINUED | OUTPATIENT
Start: 2025-03-28 | End: 2025-03-28 | Stop reason: HOSPADM

## 2025-03-28 RX ORDER — OXYCODONE HYDROCHLORIDE 5 MG/1
5 TABLET ORAL EVERY 4 HOURS PRN
Status: DISCONTINUED | OUTPATIENT
Start: 2025-03-28 | End: 2025-03-28 | Stop reason: HOSPADM

## 2025-03-28 RX ORDER — FENTANYL CITRATE 50 UG/ML
50 INJECTION, SOLUTION INTRAMUSCULAR; INTRAVENOUS EVERY 5 MIN PRN
Status: DISCONTINUED | OUTPATIENT
Start: 2025-03-28 | End: 2025-03-28 | Stop reason: HOSPADM

## 2025-03-28 RX ORDER — FENTANYL CITRATE 50 UG/ML
INJECTION, SOLUTION INTRAMUSCULAR; INTRAVENOUS AS NEEDED
Status: DISCONTINUED | OUTPATIENT
Start: 2025-03-28 | End: 2025-03-28

## 2025-03-28 RX ORDER — PROPOFOL 10 MG/ML
INJECTION, EMULSION INTRAVENOUS AS NEEDED
Status: DISCONTINUED | OUTPATIENT
Start: 2025-03-28 | End: 2025-03-28

## 2025-03-28 RX ORDER — LIDOCAINE HYDROCHLORIDE 20 MG/ML
INJECTION, SOLUTION INFILTRATION; PERINEURAL AS NEEDED
Status: DISCONTINUED | OUTPATIENT
Start: 2025-03-28 | End: 2025-03-28

## 2025-03-28 RX ORDER — CEFAZOLIN 1 G/1
INJECTION, POWDER, FOR SOLUTION INTRAVENOUS AS NEEDED
Status: DISCONTINUED | OUTPATIENT
Start: 2025-03-28 | End: 2025-03-28

## 2025-03-28 RX ORDER — METOCLOPRAMIDE HYDROCHLORIDE 5 MG/ML
10 INJECTION INTRAMUSCULAR; INTRAVENOUS ONCE AS NEEDED
Status: DISCONTINUED | OUTPATIENT
Start: 2025-03-28 | End: 2025-03-28 | Stop reason: HOSPADM

## 2025-03-28 RX ORDER — ONDANSETRON HYDROCHLORIDE 2 MG/ML
INJECTION, SOLUTION INTRAVENOUS AS NEEDED
Status: DISCONTINUED | OUTPATIENT
Start: 2025-03-28 | End: 2025-03-28

## 2025-03-28 RX ORDER — SODIUM CHLORIDE, SODIUM LACTATE, POTASSIUM CHLORIDE, CALCIUM CHLORIDE 600; 310; 30; 20 MG/100ML; MG/100ML; MG/100ML; MG/100ML
100 INJECTION, SOLUTION INTRAVENOUS CONTINUOUS
Status: SHIPPED | OUTPATIENT
Start: 2025-03-28 | End: 2025-03-28

## 2025-03-28 RX ORDER — HYDROCODONE BITARTRATE AND ACETAMINOPHEN 5; 325 MG/1; MG/1
1 TABLET ORAL EVERY 6 HOURS PRN
Qty: 28 TABLET | Refills: 0 | Status: SHIPPED | OUTPATIENT
Start: 2025-03-28 | End: 2025-04-04

## 2025-03-28 RX ORDER — SODIUM CHLORIDE 0.9 G/100ML
INJECTION, SOLUTION IRRIGATION AS NEEDED
Status: DISCONTINUED | OUTPATIENT
Start: 2025-03-28 | End: 2025-03-28 | Stop reason: HOSPADM

## 2025-03-28 RX ORDER — FENTANYL CITRATE 50 UG/ML
25 INJECTION, SOLUTION INTRAMUSCULAR; INTRAVENOUS EVERY 5 MIN PRN
Status: DISCONTINUED | OUTPATIENT
Start: 2025-03-28 | End: 2025-03-28 | Stop reason: HOSPADM

## 2025-03-28 RX ORDER — ONDANSETRON HYDROCHLORIDE 2 MG/ML
4 INJECTION, SOLUTION INTRAVENOUS ONCE AS NEEDED
Status: DISCONTINUED | OUTPATIENT
Start: 2025-03-28 | End: 2025-03-28 | Stop reason: HOSPADM

## 2025-03-28 RX ORDER — BUPIVACAINE HYDROCHLORIDE 5 MG/ML
INJECTION, SOLUTION EPIDURAL; INTRACAUDAL; PERINEURAL AS NEEDED
Status: DISCONTINUED | OUTPATIENT
Start: 2025-03-28 | End: 2025-03-28 | Stop reason: HOSPADM

## 2025-03-28 RX ORDER — MIDAZOLAM HYDROCHLORIDE 1 MG/ML
INJECTION, SOLUTION INTRAMUSCULAR; INTRAVENOUS AS NEEDED
Status: DISCONTINUED | OUTPATIENT
Start: 2025-03-28 | End: 2025-03-28

## 2025-03-28 RX ORDER — KETOROLAC TROMETHAMINE 30 MG/ML
INJECTION, SOLUTION INTRAMUSCULAR; INTRAVENOUS AS NEEDED
Status: DISCONTINUED | OUTPATIENT
Start: 2025-03-28 | End: 2025-03-28

## 2025-03-28 RX ORDER — HYDROMORPHONE HYDROCHLORIDE 1 MG/ML
INJECTION, SOLUTION INTRAMUSCULAR; INTRAVENOUS; SUBCUTANEOUS AS NEEDED
Status: DISCONTINUED | OUTPATIENT
Start: 2025-03-28 | End: 2025-03-28

## 2025-03-28 RX ORDER — APREPITANT 40 MG/1
40 CAPSULE ORAL ONCE
Status: COMPLETED | OUTPATIENT
Start: 2025-03-28 | End: 2025-03-28

## 2025-03-28 RX ORDER — LIDOCAINE HYDROCHLORIDE 10 MG/ML
0.1 INJECTION, SOLUTION EPIDURAL; INFILTRATION; INTRACAUDAL; PERINEURAL ONCE
Status: DISCONTINUED | OUTPATIENT
Start: 2025-03-28 | End: 2025-03-28 | Stop reason: HOSPADM

## 2025-03-28 RX ADMIN — SODIUM CHLORIDE, POTASSIUM CHLORIDE, SODIUM LACTATE AND CALCIUM CHLORIDE: 600; 310; 30; 20 INJECTION, SOLUTION INTRAVENOUS at 07:28

## 2025-03-28 RX ADMIN — KETOROLAC TROMETHAMINE 15 MG: 30 INJECTION, SOLUTION INTRAMUSCULAR; INTRAVENOUS at 08:49

## 2025-03-28 RX ADMIN — DEXAMETHASONE SODIUM PHOSPHATE 4 MG: 4 INJECTION, SOLUTION INTRAMUSCULAR; INTRAVENOUS at 07:40

## 2025-03-28 RX ADMIN — MIDAZOLAM 2 MG: 1 INJECTION INTRAMUSCULAR; INTRAVENOUS at 07:27

## 2025-03-28 RX ADMIN — LIDOCAINE HYDROCHLORIDE 60 MG: 20 INJECTION, SOLUTION INFILTRATION; PERINEURAL at 07:31

## 2025-03-28 RX ADMIN — FENTANYL CITRATE 50 MCG: 50 INJECTION, SOLUTION INTRAMUSCULAR; INTRAVENOUS at 07:51

## 2025-03-28 RX ADMIN — APREPITANT 40 MG: 40 CAPSULE ORAL at 07:20

## 2025-03-28 RX ADMIN — FENTANYL CITRATE 50 MCG: 50 INJECTION, SOLUTION INTRAMUSCULAR; INTRAVENOUS at 07:31

## 2025-03-28 RX ADMIN — ONDANSETRON 4 MG: 2 INJECTION INTRAMUSCULAR; INTRAVENOUS at 08:47

## 2025-03-28 RX ADMIN — CEFAZOLIN 2 G: 1 INJECTION, POWDER, FOR SOLUTION INTRAMUSCULAR; INTRAVENOUS at 07:36

## 2025-03-28 RX ADMIN — HYDROMORPHONE HYDROCHLORIDE 0.5 MG: 1 INJECTION, SOLUTION INTRAMUSCULAR; INTRAVENOUS; SUBCUTANEOUS at 08:17

## 2025-03-28 RX ADMIN — PROPOFOL 200 MG: 10 INJECTION, EMULSION INTRAVENOUS at 07:31

## 2025-03-28 RX ADMIN — HYDROMORPHONE HYDROCHLORIDE 0.5 MG: 1 INJECTION, SOLUTION INTRAMUSCULAR; INTRAVENOUS; SUBCUTANEOUS at 08:50

## 2025-03-28 ASSESSMENT — PAIN - FUNCTIONAL ASSESSMENT
PAIN_FUNCTIONAL_ASSESSMENT: 0-10

## 2025-03-28 ASSESSMENT — PAIN SCALES - GENERAL
PAINLEVEL_OUTOF10: 0 - NO PAIN
PAINLEVEL_OUTOF10: 3
PAINLEVEL_OUTOF10: 0 - NO PAIN

## 2025-03-28 ASSESSMENT — COLUMBIA-SUICIDE SEVERITY RATING SCALE - C-SSRS
1. IN THE PAST MONTH, HAVE YOU WISHED YOU WERE DEAD OR WISHED YOU COULD GO TO SLEEP AND NOT WAKE UP?: NO
2. HAVE YOU ACTUALLY HAD ANY THOUGHTS OF KILLING YOURSELF?: NO
6. HAVE YOU EVER DONE ANYTHING, STARTED TO DO ANYTHING, OR PREPARED TO DO ANYTHING TO END YOUR LIFE?: NO

## 2025-03-28 NOTE — OP NOTE
Revision left elbow ulnar nerve decompression with submuscular transposition / 90 Minutes (L) Operative Note     Date: 3/28/2025  OR Location: [facility] OR    Name: Roney Malik : 1980, Age: 44 y.o., MRN: [id number], Sex: male    Diagnosis  Pre-op Diagnosis      * Lesion of left ulnar nerve [G56.22] Post-op Diagnosis     * Lesion of left ulnar nerve [G56.22]     Procedures  #1 revision left elbow ulnar nerve decompression with anterior submuscular transposition  #2 flexor pronator lengthening and repair left elbow and forearm  #3 left median nerve decompression with release of ligament of [place]  #4 debridement medial triceps tendon    Surgeons      * Brock Mejia - Primary    Resident/Fellow/Other Assistant:  Surgeons and Role:     * Efren Blancas DO - Resident - Assisting     * Dar Dejesus MD - Resident - Assisting    Staff:   Circulator: Shelly Holder Person: Emerson    Anesthesia Staff: Anesthesiologist: Lani Cowan DO  C-AA: JOSE Akhtar    Procedure Summary  Anesthesia: General  ASA: II  Estimated Blood Loss: 3 mL  Intra-op Medications:   Administrations occurring from 0723 to 0908 on 25:   Medication Name Total Dose   sodium chloride 0.9 % irrigation solution 150 mL   ceFAZolin (Ancef) vial 1 g 2 g   dexAMETHasone (Decadron) 4 mg/mL 4 mg   fentaNYL (Sublimaze) injection 50 mcg/mL 100 mcg   HYDROmorphone (Dilaudid) injection 1 mg/mL 1 mg   ketorolac (Toradol) injection 30 mg 15 mg   lidocaine (Xylocaine) injection 2 % 60 mg   midazolam (Versed) injection 1 mg/mL 2 mg   ondansetron (Zofran) 2 mg/mL injection 4 mg   propofol (Diprivan) injection 10 mg/mL 200 mg              Anesthesia Record               Intraprocedure I/O Totals       None           Specimen: No specimens collected              Drains and/or Catheters: * None in log *    Tourniquet Times:   * Missing tourniquet times found for documented tourniquets in lo *     Implants:     Findings:  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 male who is having surgery for Lesion of left ulnar nerve [G56.22].      The patient was seen in the preoperative area. The risks, benefits, complications, treatment options, non-operative alternatives, expected recovery and outcomes were discussed with the patient. The possibilities of reaction to medication, pulmonary aspiration, injury to surrounding structures, bleeding, recurrent infection, the need for additional procedures, failure to diagnose a condition, and creating a complication requiring transfusion or operation were discussed with the patient. The patient concurred with the proposed plan, giving informed consent.  The site of surgery was properly noted/marked if necessary per policy. The patient has been actively warmed in preoperative area. Preoperative antibiotics have been ordered and given within 1 hours of incision. Venous thrombosis prophylaxis have been ordered including bilateral sequential compression devices    Procedure Details:   Patient is a 44-year-old gentleman who previously underwent a ulnar nerve decompression with anterior subcutaneous transposition by another surgeon over a year ago but has recurrence and progression of his symptoms.  Neuromuscular ultrasound examination revealed compressive findings around the ulnar nerve and its new anterior position.  Triceps snapping was also identified.  X-rays at an outside facility identified the presence of a supracondylar process on the distal humerus which was palpable as well.  He presents today for revision ulnar nerve decompression with anterior submuscular transposition.  We will plan for evaluation and possible debridement of the triceps tendon based on clinical appearance of snapping with elbow flexion.   Preoperatively left arm is identified and marked for surgery.  Informed consent process was completed.    Patient is brought to the operating and placed supine on the operating table.  A timeout procedure was performed to verify the correct patient, procedure and operative site.  Intravenous antibiotics were administered.  General anesthetic was initiated by the anesthesia service.  Left upper extremity prepped and draped in usual sterile fashion.  Limb is exsanguinated and tourniquet was inflated to 250 mmHg.    The prior surgical incision was marked out.  This was about 1-1/2 inches in length.  We then made a much more extensile incision extending roughly 10 cm above the medial epicondyle and 10 cm distal to the medial epicondyle in line with the ulnar nerve course.  Beginning proximally through native tissue we dissected down to the deep fascia.  We identified the brachial fascia overlying the triceps.  We are identified the medial intermuscular septum which was still present at this level.  We identified the ulnar nerve.  We then carefully traced the ulnar nerve distally by releasing the overlying fascia and arcade of [place].  Just proximal to medial epicondyle the nerve was in a more superficial position.  There was dense scarring around the ulnar nerve.  With great care we dissected the nerve free of all of the scar tissue through the prior surgical field.  We then followed the ulnar nerve into the FCU.  The fascia overlying the FCU was pristine and had not been divided at the index procedure.  We released the fascia overlying the FCU and then bluntly dissected between the 2 heads of the FCU.  We then released the deep FCU fascia as well.  We found that the ulnar nerve was kinked as it entered underneath the deep FCU fascia.  Now with a nerve completely exposed we placed Vesseloops carefully around the nerve proximally allowing us to mobilize the ulnar nerve away from the triceps and release any of the scar  that had formed along the deep surface of the ulnar nerve.  Motor branches to the ulnar and humeral head of the FCU were well-preserved.      Now with the nerve completely decompressed and mobile we turned our attention to preparation for the submuscular transposition.  Anteriorly we released the lacertus fibrosis.  We identified the leading edge of the pronator teres.  We identified the median nerve adjacent to the brachial artery lying anterior to the brachialis.  We again palpated the supracondylar process which was more lateral than the median nerve.  Exposure of the median nerve did reveal the ligament of [place] running directly over the median nerve on its course to the medial epicondyle.  The ligament of [place] was exposed and then released to decompress the median nerve.    With the flexor pronator mass now fully exposed we performed a Z-lengthening of the flexor pronator completely elevating the flexor pronator origin off of the medial epicondyle.  This allowed us now to place the ulnar nerve adjacent to the median nerve in a well protected position.  The wound was copiously irrigated.  We confirmed complete release of the ulnar and median nerve.  There is no evidence of kinking or compression on either 1 of these structures.  The flexor pronator mass was then repaired in a lengthened fashion with multiple #2 Ethibond sutures at the repair site.  We then closed the interval between the ulnar and humeral head of the FCU distally to minimize any potential muscle bulging and herniation.      The elbow was taken through a range of motion.  The repair of the flexor pronator mass was stable.  But we did identify snapping of the medial triceps tendon over the posterior aspect of the medial epicondyle with elbow flexion.  This was exposed.  There was a dense fibrous band along the medial triceps tendon which was excised.  Now with elbow flexion there was smooth gliding of the muscle of the triceps over the  medial epicondyle with no more snapping.    The wound was irrigated again and then closed interrupted fashion.  Local anesthetic infiltrated around the surgical field.  Sterile bandages were applied.  The tourniquet was deflated.  The patient was placed into a well-padded long-arm splint.  He was awoken from his anesthetic and transferred to recovery in stable condition.    Postoperatively he will be discharged home once comfortable.  He will remain in his splint until he returns to clinic in roughly 2 weeks.  At that time we will transition him into a removable wrist brace that he can take off for hygiene purposes.  We will have him wear a sling for protection but allow him to begin gentle elbow range of motion.  We will have him avoid any resisted wrist hand or elbow movement activities for at least 6 to 8 weeks.    Increased procedural services were necessary particularly for the revision ulnar nerve compression because of the revision status of this procedure and the increased potential risk for iatrogenic nerve injury.        Complications:  None; patient tolerated the procedure well.    Disposition: PACU - hemodynamically stable.  Condition: stable                 Additional Details:      Attending Attestation: I was present and scrubbed for the entire procedure.    Brock Mejia  Phone Number: [phone]

## 2025-03-28 NOTE — DISCHARGE INSTRUCTIONS
Post-Operative Instructions  Dr. Brock Mejia [phone]    Dressing:  You have a bandage or splint covering your operative site.    Do not remove the dressing until your next scheduled appointment with your surgeon or therapist. Keep your dressing clean and dry. The dressing will be removed at that appointment.     Post Anesthesia Instructions:  If you received general anesthesia or IV sedation for your procedure for the next 24 hours: No driving, no drinking alcohol, no sleeping aids, no important decision making, and have an adult with you for 24 hours.    Showering:  You may shower following surgery but please adhere to above instructions regarding the dressing. If showering with bandage/splint in place please ensure that it is kept dry and covered while bathing. There are commercially available cast bags that can be used to protect your dressing while showering. If using garbage bags please make sure that there are no holes in the bag and that the bag has been sealed above the dressing. If the bandage gets wet you must contact your surgeon's office to make arrangements to be seen to have the bandage changed.     Ice/Elevation:  The application of ice to your surgical site after surgery will help with pain control and swelling. This can be very effective for 48-72 hours after surgery. Please be careful to avoid getting bandage wet from a leaky ice bag. Please be advised that the ice may need to be applied for longer periods of time for the cooling effect to penetrate the post-operative dressing.     Elevation of the operative site above the level of the heart as much as possible for the first 48-72 hours after surgery. Use your sling if you have been provided one while standing or walking. If your fingers are not included in the dressing you are encouraged to attempt finger range of motion as this will help with your hand swelling and ultimate recovery.    Pain Medication:  If you received a regional  anesthetic on the day of your surgery your arm and hand may be numb for up to 24 hours after surgery. It is important to wear your sling while the block is still effective in order to protect your arm. It is advisable to take pain medications prior to going to sleep in case the regional anesthesia medication wears off while you are sleeping.    Take your pain medications as directed. Narcotic pain medications can cause lethargy, nausea and constipation. Please contact your surgeon's office and discontinue the medication if these symptoms become problematic. Eating a regular diet, drinking fluids and walking can help with constipation from these medications. Avoid alcohol consumption and driving while taking narcotic pain medications.     Additional pain control options:     You are encouraged to take over the counter medications like Advil / Motrin / Ibuprofen / Aleve in addition to your prescribed pain medications after surgery.    Smoking/Tobacco:  Tobacco use is known to interfere with wound and fracture healing and increase post-operative pain. It can also increase your risk of poor outcomes following surgery. Please do not use tobacco or nicotine products following your surgery. This includes smokeless tobacco, or nicotine replacement products (patches, gum, etc.).    Driving:  It is not advisable to operate a vehicle while using narcotic pain medications. Additionally, please be advised that you are likely to have challenges operating a car or motorcycle while you are still in your postoperative dressing and this could increase your risk of being involved in an accident and being cited for driving while physically impaired.     Warning Signs:  Observe your arm/hand and incision site (if visible) for increased redness, inflammation, drainage, odor or pain that is unrelieved by rest, elevation or medication. Please contact your surgeon's office immediately if you develop any of these issues or if you develop a  fever greater than 101°.    Follow Up Appointments:  Your post-operative appointment has been scheduled for 4/10/25 at 10 am     [facility], [address], [place], [place], [address]

## 2025-03-28 NOTE — ANESTHESIA POSTPROCEDURE EVALUATION
Patient: Roney Malik    Procedure Summary       Date: 03/28/25 Room / Location: [facility] / [facility]    Anesthesia Start: 0728 Anesthesia Stop: 0930    Procedure: Revision left elbow ulnar nerve decompression with submuscular transposition / 90 Minutes (Left: Elbow) Diagnosis:       Lesion of left ulnar nerve      (Lesion of left ulnar nerve [G56.22])    Surgeons: Brock Mejia MD Responsible Provider: Lani Cowan DO    Anesthesia Type: general ASA Status: 2            Anesthesia Type: general    Vitals Value Taken Time   /83 03/28/25 0945   Temp 36 °C (96.8 °F) 03/28/25 0925   Pulse 77 03/28/25 0945   Resp 18 03/28/25 0945   SpO2 100 03/28/25 1040       Anesthesia Post Evaluation    Patient location during evaluation: PACU  Patient participation: complete - patient participated  Level of consciousness: awake  Pain management: satisfactory to patient  Multimodal analgesia pain management approach  Airway patency: patent  Cardiovascular status: acceptable  Respiratory status: acceptable  Hydration status: acceptable  Postoperative Nausea and Vomiting: none      No notable events documented.

## 2025-03-28 NOTE — ANESTHESIA PREPROCEDURE EVALUATION
Patient: Roney Malik    Procedure Information       Date/Time: 03/28/25 0723    Procedure: Revision left elbow ulnar nerve decompression with submuscular transposition / 90 Minutes (Left: Elbow)    Location: [facility] OR 02 / Virtual [facility] OR    Surgeons: Brock Mejia MD            Relevant Problems   Anesthesia (within normal limits)      Cardiac (within normal limits)      Pulmonary (within normal limits)      Neuro  Raynaud's   (+) Lesion of left ulnar nerve      GI (within normal limits)      /Renal (within normal limits)      Liver (within normal limits)      Endocrine (within normal limits)      Hematology (within normal limits)      Musculoskeletal (within normal limits)      HEENT (within normal limits)      ID (within normal limits)      Skin (within normal limits)      GYN (within normal limits)       Clinical information reviewed:   Tobacco  Allergies  Meds   Med Hx  Surg Hx   Fam Hx  Soc Hx        NPO Detail:  NPO/Void Status  Date of Last Liquid: 03/27/25  Time of Last Liquid: 2230  Date of Last Solid: 03/28/25  Time of Last Solid: 0530         Physical Exam    Airway  Mallampati: II  TM distance: >3 FB  Neck ROM: full     Cardiovascular    Dental - normal exam     Pulmonary    Abdominal        Anesthesia Plan    History of general anesthesia?: yes  History of complications of general anesthesia?: no    ASA 2     general     intravenous induction   Anesthetic plan and risks discussed with patient.    Plan discussed with CRNA.

## 2025-03-28 NOTE — H&P
"History Of Present Illness  Roney Malik is a 44 y.o. male presenting with recurrent left elbow cubital tunnel syndrome following ulnar nerve decompression by another surgeon.     Past Medical History  Past Medical History:   Diagnosis Date    Joint pain     Raynaud disease     Unspecified tear of unspecified meniscus, current injury, right knee, initial encounter 04/27/2018    Acute meniscal tear of knee, right, initial encounter       Surgical History  Past Surgical History:   Procedure Laterality Date    ANTERIOR CRUCIATE LIGAMENT REPAIR      LUMBAR LAMINECTOMY      MICRODISCECTOMY LUMBAR      OTHER SURGICAL HISTORY Left     elbow ulnar nerve decompression    OTHER SURGICAL HISTORY Left     shoulder scope with decompression and debridement    OTHER SURGICAL HISTORY Left     foot excision of neuroma/distal nerve    ROTATOR CUFF REPAIR Left         Social History  He reports that he has never smoked. He has never used smokeless tobacco. He reports that he does not currently use alcohol. He reports that he does not use drugs.    Family History  Family History   Problem Relation Name Age of Onset    Other (htn) Mother          \"mom had nerve block before that never resolved, per patient\"    Mental illness Mother      Coronary artery disease Father      Other (htn) Father          Allergies  Scopolamine    Review of Systems   All other systems reviewed and are negative.       Physical Exam  Physical Examination Findings:  Constitutional: Appears well-developed and well-nourished.  Head: Normocephalic and atraumatic.  Eyes: Pupils are equal and round.  Cardiovascular: Intact distal pulses.   Respiratory: Effort normal. No respiratory distress.  Neurologic: Alert and oriented to person, place, and time.  Skin: Skin is warm and dry.  Hematologic / Lympahtic: No lymphedema, lymphangitis.  Psychiatric: normal mood and affect. Behavior is normal.   Musculoskeletal: Left elbow with healed surgical incision medial elbow.  " Positive Tinel's sign ulnar nerve.  Diminished sensation ulnar nerve distribution left hand    Last Recorded Vitals  Weight 84.8 kg (187 lb).     Assessment/Plan   Assessment & Plan  Lesion of left ulnar nerve      Will proceed with revision left elbow ulnar nerve decompression and submuscular transposition as scheduled       Brock Mejia MD

## 2025-03-28 NOTE — ANESTHESIA PROCEDURE NOTES
Airway  Date/Time: 3/28/2025 7:34 AM  Urgency: elective    Airway not difficult    Staffing  Performed: [facility]   Authorized by: JOSE Akhtar    Performed by: JOSE Akhtar  Patient location during procedure: OR    Indications and Patient Condition  Indications for airway management: anesthesia  Spontaneous Ventilation: absent  Sedation level: deep  Preoxygenated: yes  Patient position: sniffing  Mask difficulty assessment: 1 - vent by mask  Planned trial extubation    Final Airway Details  Final airway type: supraglottic airway      Successful airway: Supraglottic airway: igel.  Size 4     Number of attempts at approach: 1

## 2025-04-07 NOTE — PROGRESS NOTES
[facility]  Hand and Upper Extremity Service  Post Operative visit         Date of surgery: 3/28/25    Surgery(s) performed: Revision left elbow ulnar nerve decompression with anterior submuscular transposition        Subjective report: First postoperative visit. Overall he is doing well and pain is well controlled.        Exam findings: Right elbow reveals well healing surgical incision at the medial elbow. Moderate swelling and ecchymosis. Full finger range of motion and able to demonstrate elbow extension to about 30 degrees and flexion to about  degrees.        Impression: Cubital tunnel syndrome       Plan: We have provided him with a wrist brace that he will wear for protection to avoid heavy gripping and twisting that he can remove for hygiene purposes. He should continue to wear the splint to protect his extensor pronator repair. We gave him instructions for active assisted and gravity assisted elbow flexion and extension. He should not lift anything heavier than a glass of water. He will return in 3 weeks for repeat clinical examination with advancement of activities and referral to therapy.      Patient was prescribed a cock up wrist splint for cubital tunnel syndrome. The patient has weakness, instability and/or deformity of their left elbow which requires stabilization from this orthosis to improve their function.      Verbal and written instructions for the use, wear schedule, cleaning and application of this item were given.  Patient was instructed that should the brace result in increased pain, decreased sensation, increased swelling, or an overall worsening of their medical condition, to please contact our office immediately.     Orthotic management and training was provided for skin care, modifications due to healing tissues, edema changes, interruption in skin integrity, and safety precautions with the orthosis.        Follow Up: 3 weeks             Brock RIVAS  MD Roberto    [facility] [employer]  Department of Orthopaedic Surgery  Chief of Hand and Upper Extremity Surgery  [facility]    Scribe Attestation  By signing my name below, I, Radha Sands   attest that this documentation has been prepared under the direction and in the presence of Dr. Brock Mejia.      Dictation performed with the use of voice recognition software. Syntax and grammatical errors may exist.

## 2025-04-10 ENCOUNTER — APPOINTMENT (OUTPATIENT)
Dept: ORTHOPEDIC SURGERY | Facility: CLINIC | Age: 45
End: 2025-04-10
Payer: COMMERCIAL

## 2025-04-10 VITALS — BODY MASS INDEX: 23.23 KG/M2 | WEIGHT: 181 LBS | HEIGHT: 74 IN

## 2025-04-10 DIAGNOSIS — G56.22 LESION OF LEFT ULNAR NERVE: Primary | ICD-10-CM

## 2025-04-10 PROCEDURE — 99211 OFF/OP EST MAY X REQ PHY/QHP: CPT | Performed by: ORTHOPAEDIC SURGERY

## 2025-04-10 PROCEDURE — L3908 WHO COCK-UP NONMOLDE PRE OTS: HCPCS | Performed by: ORTHOPAEDIC SURGERY

## 2025-04-10 ASSESSMENT — PAIN - FUNCTIONAL ASSESSMENT: PAIN_FUNCTIONAL_ASSESSMENT: 0-10

## 2025-04-10 ASSESSMENT — PAIN SCALES - GENERAL: PAINLEVEL_OUTOF10: 3

## 2025-04-10 ASSESSMENT — PAIN DESCRIPTION - DESCRIPTORS: DESCRIPTORS: ACHING;SORE

## 2025-04-14 ENCOUNTER — APPOINTMENT (OUTPATIENT)
Dept: ORTHOPEDIC SURGERY | Facility: CLINIC | Age: 45
End: 2025-04-14
Payer: COMMERCIAL

## 2025-04-16 ENCOUNTER — APPOINTMENT (OUTPATIENT)
Dept: ORTHOPEDIC SURGERY | Facility: HOSPITAL | Age: 45
End: 2025-04-16
Payer: COMMERCIAL

## 2025-04-21 NOTE — PROGRESS NOTES
[facility]  Hand and Upper Extremity Service  Post Operative visit         Date of surgery: 3/28/25    Surgery(s) performed: Revision left elbow ulnar nerve decompression with submuscular transposition        Subjective report:  Second postoperative visit. He is overall doing well but still having swelling and stiffness in the whole left arm. He still has numbness and tingling in the ring and small finger.        Exam findings: Left elbow reveals well healed surgical incision over the medial elbow. Persistent swelling at the surgical site. Fairly symmetric bilateral elbow flexion and lacks perhaps 5-10 degrees of terminal elbow extension. Excellent digital range of motion but cannot make a full fist due to swelling. Sensation subjectively diminished in ring and small finger but good intrinsic muscle function.        Impression: Cubital tunnel syndrome       Plan: We are going to get him going into therapy now focusing on range of motion and edema with light strengthening of the hand but he is not cleared for formal heavy weight lifting yet. He will return in 4 weeks for repeat clinical examination and if he is doing well at that time we will allow him to progress to heavier activities.         Follow Up: 4 weeks            Brock Mejia MD    [facility] [employer]  Department of Orthopaedic Surgery  Chief of Hand and Upper Extremity Surgery  [facility]    Scribe Attestation  By signing my name below, IKatya Scribe   attest that this documentation has been prepared under the direction and in the presence of Dr. Brock Mejia.      Dictation performed with the use of voice recognition software. Syntax and grammatical errors may exist.

## 2025-05-01 ENCOUNTER — OFFICE VISIT (OUTPATIENT)
Dept: ORTHOPEDIC SURGERY | Facility: CLINIC | Age: 45
End: 2025-05-01
Payer: COMMERCIAL

## 2025-05-01 VITALS — BODY MASS INDEX: 23.23 KG/M2 | HEIGHT: 74 IN | WEIGHT: 181 LBS

## 2025-05-01 DIAGNOSIS — G56.22 LESION OF LEFT ULNAR NERVE: Primary | ICD-10-CM

## 2025-05-01 PROCEDURE — 99211 OFF/OP EST MAY X REQ PHY/QHP: CPT | Performed by: ORTHOPAEDIC SURGERY

## 2025-05-01 PROCEDURE — 3008F BODY MASS INDEX DOCD: CPT | Performed by: ORTHOPAEDIC SURGERY

## 2025-05-01 PROCEDURE — 1036F TOBACCO NON-USER: CPT | Performed by: ORTHOPAEDIC SURGERY

## 2025-06-04 NOTE — PROGRESS NOTES
[facility]  Hand and Upper Extremity Service  Post Operative visit         Date of surgery: 3/28/25    Surgery(s) performed: Revision left elbow ulnar nerve decompression with submuscular transposition        Subjective report: Third postoperative visit. Overall doing very well and he has started to do some light weight lifting in the gym. He has mild numbness and tightness along the medial elbow and proximal forearm but no pain at the elbow. He still has numbness in the ring and small finger and feels it may be a little more numb than before surgery but he has progressed with his return to activities. He has some tenderness at right middle finger A1 pulley particularly with forceful grasping type maneuvers.        Exam findings: Left elbow reveals well healed surgical incision. Full elbow range of motion. Slightly diminished sensation over the medial elbow. 5 out of 5 intrinsic muscle strength in the left hand. Sensation is subjectively diminished in the ring and small finger. Tenderness to palpation middle finger A1 pulley but no obvious triggering on examination today.         Impression: Cubital tunnel syndrome        Plan: We are going to allow him to return to his activities as he tolerated. I suspect his sensation will continue to improve. He already has excellent motor function of the nerve and more importantly for him, he is not having any of the snapping he was previously appreciating with elbow flexion from his triceps tendon. He is stating to notice some mild numbness in the right small finger and this is something he will keep an eye on. He will return to see me in 2 months for repeat clinical examination and if he is still having issues with his left middle finger, we will discuss steroid injections into the tendon sheath and if his right hand numbness symptoms are worsening we will discuss further diagnostic workup for possible right cubital tunnel syndrome.         Follow Up: 2 months            Brock Mejia MD    [facility] [employer]  Department of Orthopaedic Surgery  Chief of Hand and Upper Extremity Surgery  [facility]    Scribe Attestation  By signing my name below, IKatya Scribe   attest that this documentation has been prepared under the direction and in the presence of Dr. Brock Mejia.      Dictation performed with the use of voice recognition software. Syntax and grammatical errors may exist.

## 2025-06-05 ENCOUNTER — OFFICE VISIT (OUTPATIENT)
Dept: ORTHOPEDIC SURGERY | Facility: CLINIC | Age: 45
End: 2025-06-05
Payer: COMMERCIAL

## 2025-06-05 VITALS — WEIGHT: 181 LBS | HEIGHT: 74 IN | BODY MASS INDEX: 23.23 KG/M2

## 2025-06-05 DIAGNOSIS — G56.22 LESION OF LEFT ULNAR NERVE: Primary | ICD-10-CM

## 2025-06-05 PROCEDURE — 99024 POSTOP FOLLOW-UP VISIT: CPT | Performed by: ORTHOPAEDIC SURGERY

## 2025-06-05 PROCEDURE — 99212 OFFICE O/P EST SF 10 MIN: CPT | Performed by: ORTHOPAEDIC SURGERY

## 2025-06-05 PROCEDURE — 3008F BODY MASS INDEX DOCD: CPT | Performed by: ORTHOPAEDIC SURGERY

## 2025-06-05 PROCEDURE — 1036F TOBACCO NON-USER: CPT | Performed by: ORTHOPAEDIC SURGERY

## 2025-06-12 ENCOUNTER — OFFICE VISIT (OUTPATIENT)
Dept: ORTHOPEDIC SURGERY | Facility: CLINIC | Age: 45
End: 2025-06-12
Payer: COMMERCIAL

## 2025-06-12 VITALS — HEIGHT: 74 IN | WEIGHT: 181 LBS | BODY MASS INDEX: 23.23 KG/M2

## 2025-06-12 DIAGNOSIS — M65.30 TRIGGER FINGER, ACQUIRED: Primary | ICD-10-CM

## 2025-06-12 PROCEDURE — 99212 OFFICE O/P EST SF 10 MIN: CPT | Mod: 25 | Performed by: PHYSICIAN ASSISTANT

## 2025-06-12 PROCEDURE — 20550 NJX 1 TENDON SHEATH/LIGAMENT: CPT | Mod: LT | Performed by: PHYSICIAN ASSISTANT

## 2025-06-12 PROCEDURE — 2500000004 HC RX 250 GENERAL PHARMACY W/ HCPCS (ALT 636 FOR OP/ED): Performed by: PHYSICIAN ASSISTANT

## 2025-06-12 RX ORDER — TRIAMCINOLONE ACETONIDE 40 MG/ML
20 INJECTION, SUSPENSION INTRA-ARTICULAR; INTRAMUSCULAR
Status: COMPLETED | OUTPATIENT
Start: 2025-06-12 | End: 2025-06-12

## 2025-06-12 RX ORDER — LIDOCAINE HYDROCHLORIDE 10 MG/ML
0.5 INJECTION, SOLUTION INFILTRATION; PERINEURAL
Status: COMPLETED | OUTPATIENT
Start: 2025-06-12 | End: 2025-06-12

## 2025-06-12 RX ADMIN — TRIAMCINOLONE ACETONIDE 20 MG: 400 INJECTION, SUSPENSION INTRA-ARTICULAR; INTRAMUSCULAR at 08:43

## 2025-06-12 RX ADMIN — LIDOCAINE HYDROCHLORIDE 0.5 ML: 10 INJECTION, SOLUTION INFILTRATION; PERINEURAL at 08:43

## 2025-06-12 NOTE — PROGRESS NOTES
Roney returns to clinic today for follow-up of his left middle finger.  He was just recently seen by Dr. Mejia last week at that time he was having pain over the A1 pulley of his left middle finger.  This was affecting the use of his hand.  He is still recovering after a revision left ulnar nerve decompression with submuscular transposition.  He feels he notices improvement every few days.  He presents today requesting a steroid injection.    Examination: Well-healed surgical incision of the left elbow full elbow range of motion mild diminished sensation in the ulnar nerve distribution.  Tenderness to palpation over the left middle finger A1 pulley.  No active triggering with finger flexion extension today.    Impression: Left middle finger pain, cubital tunnel syndrome    Plan: Steroid injection was performed today into the A1 pulley of the left middle finger.  He will continue to monitor symptoms following steroid injection and return to our office for recurrence or progression of symptoms, he is scheduled to follow-up postoperatively in August with Dr. Mejia.    Patient ID: Roney Malik is a 44 y.o. male.    Hand / UE Inj/Asp: L long A1 for trigger finger on 6/12/2025 8:43 AM  Details: 25 G needle  Medications: 20 mg triamcinolone acetonide 40 mg/mL; 0.5 mL lidocaine 10 mg/mL (1 %)  Procedure, treatment alternatives, risks and benefits explained, specific risks discussed. Immediately prior to procedure a time out was called to verify the correct patient, procedure, equipment, support staff and site/side marked as required.           Siobhan Morin PA-C  Department of Orthopaedic Surgery  [facility]    Dictation performed with the use of voice recognition software. Syntax and grammatical errors may exist.

## 2025-06-30 ENCOUNTER — OFFICE VISIT (OUTPATIENT)
Dept: ORTHOPEDIC SURGERY | Facility: HOSPITAL | Age: 45
End: 2025-06-30
Payer: COMMERCIAL

## 2025-06-30 DIAGNOSIS — M75.102 TEAR OF LEFT ROTATOR CUFF, UNSPECIFIED TEAR EXTENT, UNSPECIFIED WHETHER TRAUMATIC: Primary | ICD-10-CM

## 2025-06-30 PROCEDURE — 1036F TOBACCO NON-USER: CPT | Performed by: SPECIALIST/TECHNOLOGIST

## 2025-06-30 PROCEDURE — 2500000004 HC RX 250 GENERAL PHARMACY W/ HCPCS (ALT 636 FOR OP/ED): Performed by: SPECIALIST/TECHNOLOGIST

## 2025-06-30 PROCEDURE — 99213 OFFICE O/P EST LOW 20 MIN: CPT | Performed by: SPECIALIST/TECHNOLOGIST

## 2025-06-30 PROCEDURE — 20610 DRAIN/INJ JOINT/BURSA W/O US: CPT | Mod: LT | Performed by: SPECIALIST/TECHNOLOGIST

## 2025-06-30 RX ORDER — LIDOCAINE HYDROCHLORIDE 10 MG/ML
4 INJECTION, SOLUTION INFILTRATION; PERINEURAL
Status: COMPLETED | OUTPATIENT
Start: 2025-06-30 | End: 2025-06-30

## 2025-06-30 RX ORDER — METHYLPREDNISOLONE ACETATE 40 MG/ML
40 INJECTION, SUSPENSION INTRA-ARTICULAR; INTRALESIONAL; INTRAMUSCULAR; SOFT TISSUE
Status: COMPLETED | OUTPATIENT
Start: 2025-06-30 | End: 2025-06-30

## 2025-06-30 RX ADMIN — LIDOCAINE HYDROCHLORIDE 4 ML: 10 INJECTION, SOLUTION INFILTRATION; PERINEURAL at 16:53

## 2025-06-30 RX ADMIN — METHYLPREDNISOLONE ACETATE 40 MG: 40 INJECTION, SUSPENSION INTRA-ARTICULAR; INTRALESIONAL; INTRAMUSCULAR; INTRASYNOVIAL; SOFT TISSUE at 16:53

## 2025-06-30 ASSESSMENT — PAIN - FUNCTIONAL ASSESSMENT: PAIN_FUNCTIONAL_ASSESSMENT: NO/DENIES PAIN

## 2025-06-30 NOTE — PROGRESS NOTES
Subjective    Patient ID: Roney Malik is a 44 y.o. male.    Procedure: Left shoulder arthroscopic lysis of adhesions, limited intra-articular debridement rotator cuff repair with patch augmentation and formal subacromial decompression  Date of surgery: 1/21/2025      HPI:  Roney Malik is a 44 y.o. male presenting approximately 6 months status post left shoulder arthroscopic rotator cuff repair with patch augmentation, lysis of adhesions intra-articular debridement and subacromial decompression.  Overall he is a bit frustrated with his progress 6 months out from his procedure.  He notes that this is his fourth operation on this shoulder.  He did elicit some pain over the anterior shoulder that is new over the past couple weeks.  He denies interval injury or trauma since his last office visit.  He continues with his home exercise program.  Continues to have pain with overhead activities especially at the end range of motion.  Presents for continued treatment recommendations.       ROS  Constitutional: No fever, no chills, not feeling tired, no recent weight gain and no recent weight loss  ENT: No nosebleeds  Cardiovascular: No chest pain  Respiratory: No shortness of breath and no cough  Gastrointestinal: No abdominal pain, no nausea, no diarrhea, and no vomiting  Musculoskeletal: No arthralgias  Integumentary: No rashes and no skin lesions  Neurological: No headache  Psychiatric: No sleep disturbances no depression  Endocrine: No muscle weakness and no muscle cramps  Hematologic/lymphatic: No swelling glands and no tendency for easy bruising      Objective   44 y.o. male well appearing in no acute distress. Alert and oriented ×3.  Skin intact bilateral upper extremities.   Coordination and balance intact.  Bilateral upper extremity compartments supple.  5 out of 5 distal motor strength bilaterally.  C4 through T1 sensation intact bilaterally.  2+ Radial pulses bilaterally.  Left shoulder incisions are  well-healed.  He has forward flexion to 180 degrees, abduction to 160 degrees bilaterally.  4/5 manual muscle testing shoulder abduction, empty can, external rotation and belly press left versus right secondary to weakness.  Tender to palpation over the anterior shoulder and lateral shoulder.      Assessment/Plan   Encounter Diagnoses:  No diagnosis found.  Tear of left rotator cuff, subsequent encounter  No orders of the defined types were placed in this encounter.      The patient and I discussed their clinical presentation 6 months status post left shoulder arthroscopic rotator cuff repair with patch augmentation, subacromial decompression, intra-articular debridement and lysis of adhesions on 1/21/2025.     Overall, he is quite frustrated with his postoperative progress.  He is frustrated with the return of the bone spurs and having multiple procedures done on this left shoulder.  He notes that his right shoulder is starting to give him some difficulties as well and has had a couple surgeries performed on this as well.  I encouraged him to continue with his home exercise program with scapular strength and stability and rotator cuff strength and stability.  We discussed revisiting with formal physical therapy for modality use to help with the soft tissue plane mobility.  We also agreed upon a subacromial injection.  He tolerated this procedure well.  Will have him follow back up on 9/17/2025 with Dr. Vasquez for clinical recheck and evaluation of the right shoulder.  He is in agreement the plan.  His questions are answered.    L Inj/Asp: L subacromial bursa on 6/30/2025 4:53 PM  Indications: pain  Details: 22 G needle, posterior approach  Medications: 40 mg methylPREDNISolone acetate 40 mg/mL; 4 mL lidocaine 10 mg/mL (1 %)  Outcome: tolerated well, no immediate complications  Procedure, treatment alternatives, risks and benefits explained, specific risks discussed. Consent was given by the patient. Immediately  prior to procedure a time out was called to verify the correct patient, procedure, equipment, support staff and site/side marked as required. Patient was prepped and draped in the usual sterile fashion.           **This office note was dictated using Dragon voice to text software and was not proofread for spelling or grammatical errors

## 2025-07-16 ENCOUNTER — APPOINTMENT (OUTPATIENT)
Dept: ORTHOPEDIC SURGERY | Facility: HOSPITAL | Age: 45
End: 2025-07-16
Payer: COMMERCIAL

## 2025-07-21 DIAGNOSIS — G56.21 LESION OF RIGHT ULNAR NERVE: Primary | ICD-10-CM

## 2025-07-28 ENCOUNTER — PROCEDURE VISIT (OUTPATIENT)
Dept: NEUROLOGY | Facility: HOSPITAL | Age: 45
End: 2025-07-28
Payer: COMMERCIAL

## 2025-07-28 DIAGNOSIS — G56.21 LESION OF RIGHT ULNAR NERVE: Primary | ICD-10-CM

## 2025-07-28 PROCEDURE — 76883 US NRV&ACC STRUX 1XTR COMPRE: CPT | Performed by: PSYCHIATRY & NEUROLOGY

## 2025-07-28 NOTE — PROGRESS NOTES
Procedures  NEUROMUSCULAR ULTRASOUND OF THE [RIGHT UPPER EXTREMITY]    INDICATION:  Clinical Information: History of left ulnar neuropathy that is s/p surgical decompression. More recently has started having right hand numbness in the little finger and occasional pain in the right elbow. Current study requested for evaluation of right ulnar neuropathy.     Neuromuscular ultrasound to be performed:  (a) to evaluate the echotexture and size of the right median nerve in the limb with attention to the carpal tunnel;  (b) to assess for any identifiable structural source of right median nerve compression;   (c) to assess adjacent structures around the right median nerve for abnormalities.   (d) to assess the right wrist joint for abnormalities  (e) to evaluate the echotexture and size of the right ulnar nerve throughout its course in the limb;   (f) to assess for any identifiable mechanical source of ulnar nerve compression;   (g) to identify any dynamic source of neuropathy from nerve subluxation or dislocation;  (h) to assess adjacent structures around the right elbow for abnormalities    HEIGHT: 6 ft 2 in  WEIGHT: 185 lbs.  HANDEDNESS: [Right]    COMPARISON:   [None.]    TECHNIQUE:  The right median nerve was studied at the wrist through the forearm along with detailed imaging of the complete wrist joint. The examination was performed using a Engezni P9 ultrasound machine with a [15-6 MHz matrix linear transducer]. Both axial and longitudinal views were obtained. The patient was examined [supine with the arm extended and supinated]. Cross sectional area (CSA) was measured within the epineurium, using the trace method. At locations where repeat measurements were taken, the mean value is reported below. Transverse and longitudinal images were obtained.The right ulnar nerve was studied at the wrist through the upper arm along with detailed imaging of the complete elbow joint. [With the elbow extended, the transducer was placed  at the level of the medial epicondyle as the patient´s elbow was passively flexed to determine if either ulnar nerve subluxed or dislocated out of the groove.]    FINDINGS:  At the right wrist at the distal wrist crease (proximal carpal tunnel), the median nerve CSA was 7.5 mm2 (NL < 10 mm2; borderline 10-13 mm2; ABN > 13 mm2).    The echogenicity of the right median nerve at the wrist was [normal]. The mobility of the right median nerve with flexion and extension of the fingers was normal.  No abnormal tenosynovitis of the right flexor tendons was noted.    Using a longitudinal scan of the right median nerve at the wrist, a notch sign was not seen under the flexor retinaculum.     A right persistent median artery [was not] present. A right bifid median nerve [was not] present. No other abnormal mass or ganglia was appreciated in the right carpal tunnel. With extension of the fingers, there was no abnormal intrusion of the right flexor digitorum sublimis. With flexion of the fingers, there was no abnormal intrusion of the right lumbrical muscles.    In the mid-forearm, approximately 12 cm proximal to the distal wrist crease, the right median nerve was seen between the flexor digitorum sublimis and flexor digitorum profundus muscles. At the mid-forearm, the right median nerve CSA was 6.4 mm2. The ratio of the right median CSAs between the wrist and forearm (WFR) was 1.2 (NL < 1.5; borderline: 1.5 - 2.0). The echogenicity of the right median nerve in the forearm was normal.    The right median nerve was then followed proximal into the forearm and then to the axilla. The median nerve was normal in size and echogenicity adjacent to the brachial artery.     Scanning the muscles, the echogenicity was normal of the flexor digitorum sublimis, flexor digitorum profundus, flexor pollicis longus, flexor carpi radialis, and pronator teres. The echogenicity of the abductor pollicis brevis was normal.    At the right wrist joint,  the radial carpal joint was normal. No abnormal effusion was seen. The flexor carpi radialis tendon was normal. Both the radial and ulnar arteries were well seen and were normal.    Attention was then turned to the ulnar nerve. At the wrist, the ulnar CSA was 3.4 mm2 (NL < 10 mm2). The echogenicity was normal.    At the mid-forearm slightly proximal to the location where the ulnar artery  from the ulnar nerve, the CSA was 5.9 mm2 (NL < 10 mm2). The echogenicity was [normal].    At the level of cubital tunnel, the ulnar nerve with the largest CSA was located in between the two heads of the flexor carpi ulnaris. The CSA at this location was 5.8 mm2 (NL < 10 mm2; mild ABN 10-15 mm2; moderate ABN 15-20 mm2; severely ABN > 20 mm2). The echogenicity was normal.    At the level of retrocondylar groove, the ulnar nerve with the largest CSA was located between the medial epicondyle and olecranon. The CSA at this location was 8.5 mm2 (NL < 10 mm2; mild ABN 10-15 mm2; moderate ABN 15-20 mm2; severely ABN > 20 mm2). The echogenicity was normal.    No abnormal mass was appreciated affecting the ulnar nerve in the elbow.     At the mid-arm under the fascia of the medial triceps, the CSA was 4.5 mm2 (NL < 10 mm2). The echogenicity was normal. The ulnar nerve was the followed to the axilla and was normal.    The ratio of the largest CSAs between the elbow and mid-forearm was 1.4 (NL < 1.5).    The ratio of the largest CSAs between the elbow and mid-arm was 1.9  (NL < 1.5).    When the patient fully flexed the elbow, the ulnar nerve did not sublux or dislocate of the groove.     Attention was then turned to the right elbow joint. The radiocapitellar joint was normal. The ulnar trochlear joint was normal. No abnormal effusion was present. The hyaline cartilage was well seen. On longitudinal imaging, the fat pads at the radial fossa and coronoid fossa were normal in location without any abnormal effusion.      IMPRESSION:  This is a normal neuromuscular ultrasound examination of the right upper extremity.    There was no ultrasound evidence of median neuropathy at the right wrist. In addition, the median nerve was followed through its anatomic course in the limb from wrist to axilla and was normal above the wrist. The other visualized osseous, ligamentous, joint and tendinous structures on the right appeared normal.    There was no ultrasound evidence of ulnar neuropathy at the elbow. The ulnar nerve was followed through its anatomic course in the limb from wrist to axilla and was otherwise normal. The other visualized osseous, ligamentous, joint and tendinous structures on the right appeared normal.    Angella Vital MD

## 2025-07-29 ENCOUNTER — OFFICE VISIT (OUTPATIENT)
Dept: ORTHOPEDIC SURGERY | Facility: HOSPITAL | Age: 45
End: 2025-07-29

## 2025-07-29 VITALS — BODY MASS INDEX: 23.23 KG/M2 | HEIGHT: 74 IN | WEIGHT: 181 LBS

## 2025-07-29 DIAGNOSIS — G56.21 CUBITAL TUNNEL SYNDROME ON RIGHT: Primary | ICD-10-CM

## 2025-07-29 PROCEDURE — 3008F BODY MASS INDEX DOCD: CPT | Performed by: ORTHOPAEDIC SURGERY

## 2025-07-29 PROCEDURE — 99214 OFFICE O/P EST MOD 30 MIN: CPT | Performed by: ORTHOPAEDIC SURGERY

## 2025-07-29 PROCEDURE — 1036F TOBACCO NON-USER: CPT | Performed by: ORTHOPAEDIC SURGERY

## 2025-07-29 PROCEDURE — 99212 OFFICE O/P EST SF 10 MIN: CPT | Performed by: ORTHOPAEDIC SURGERY

## 2025-07-29 NOTE — PROGRESS NOTES
[facility]  Hand and Upper Extremity Service  Follow up visit         Follow up for: bilateral arms    Interval History: He returns for his arms. Overall, continuing to do well following revision, left elbow ulnar nerve decompression with submuscular transposition earlier this year. He still has some mild numbness in his fingers on the left hand.   He is here to primarily discuss his right hand. For the last several months he has had numbness in his right small finger. About 2 weeks ago the symptoms of numbness and pain were quite severe and then they gradually improved, but they do fluctuate throughout the day and tend to be worse at nighttime. Similar to his left arm, he does occasionally experience snapping with his triceps over his epicondyl with forceful elbow flexion. He completed neuromuscular ultrasound examination yesterday and presents to review the results.               Past medical history, medications, allergies, surgical history and review of systems are reviewed and otherwise unchanged when compared to last visit on         Examination:  Constitutional: Oriented to person, place, and time.  Appears well-developed and well-nourished.  Head: Normocephalic and atraumatic.  Eyes: Pupils are equal, round, and reactive to light.  Cardiovascular: Intact distal pulses.  Pulmonary/Chest/Breast: Effort normal. No respiratory distress.  Neurological: Alert and oriented to person, place, and time.  Skin: Skin is warm and dry.  Psychiatric: normal mood and affect.  Behavior is normal.  Musculoskeletal: Right arm reveals no muscular atrophy, normal appearance of the arm in full elbow form. Wrist and hand range of motion, 5/5  strength. Subjectively diminished sensation in the small finger. He has a markedly positive Tinel sign over the ulnar nerve at the retrocondylar groove in a positive flexion compression test.       Personal Interpretation of Diagnostic studies:         Impression: Cubital tunnel syndrome       Plan: Despite his normal ultrasound results, he clearly has a very irritable ulnar nerve at the elbow. He is very happy with how he has recovered from his submuscular ulnar nerve transposition on the left. He is interested in consideration of the same procedure to his right arm, but has to figure out time relative to his work schedule. He will contact my office when he is ready to proceed with the right elbow ulnar nerve decompression with anterior submuscular transposition.       In Office Procedures Performed:       Medications Prescribed:          Follow up: PRN    For Surgical Planning:  Diagnosis: Right cubital tunnel syndrome  Procedure: Right elbow ulnar nerve decompression with anterior submuscular transposition  CPT: 68031, 87282  Anesthesia: General  Duration: 90 minutes  Special Equipment Needed: None  Medical Notes / PM / DM / PAT needed?:  N/A  Location: Any  Initial Post Operative Visit: 2 weeks             Brock Mejia MD  [facility]  Department of Orthopaedic Surgery  Hand and Upper Extremity Reconstruction    Scribe Attestation:  By signing my name below, IAnamaria Scribe attest that this documentation has been prepared under the direction and in the presence of Brock Mejia MD.    Provider Attestation - Scribe documentation:  All medical record entries made by Anamaria Marse were at my direction and personally dictated by me, Brock Mejia MD. I have reviewed the chart and agree that the record is accurate and I confirmed that it reflects my personal performance of the history, physical exam, discussion, and plan.       Dictation performed with the use of voice recognition software.  Syntax and grammatical errors may exist.

## 2025-08-01 DIAGNOSIS — G56.21 CUBITAL TUNNEL SYNDROME ON RIGHT: ICD-10-CM

## 2025-08-07 ENCOUNTER — APPOINTMENT (OUTPATIENT)
Dept: ORTHOPEDIC SURGERY | Facility: CLINIC | Age: 45
End: 2025-08-07
Payer: COMMERCIAL

## 2025-08-21 ENCOUNTER — ANESTHESIA EVENT (OUTPATIENT)
Dept: OPERATING ROOM | Facility: CLINIC | Age: 45
End: 2025-08-21
Payer: COMMERCIAL

## 2025-08-22 ENCOUNTER — HOSPITAL ENCOUNTER (OUTPATIENT)
Facility: CLINIC | Age: 45
Setting detail: OUTPATIENT SURGERY
Discharge: HOME | End: 2025-08-22
Attending: ORTHOPAEDIC SURGERY | Admitting: ORTHOPAEDIC SURGERY
Payer: COMMERCIAL

## 2025-08-22 ENCOUNTER — ANESTHESIA (OUTPATIENT)
Dept: OPERATING ROOM | Facility: CLINIC | Age: 45
End: 2025-08-22
Payer: COMMERCIAL

## 2025-08-22 ENCOUNTER — SURGERY (OUTPATIENT)
Age: 45
End: 2025-08-22
Payer: COMMERCIAL

## 2025-08-22 RX ORDER — FENTANYL CITRATE 50 UG/ML
INJECTION, SOLUTION INTRAMUSCULAR; INTRAVENOUS AS NEEDED
Status: DISCONTINUED | OUTPATIENT
Start: 2025-08-22 | End: 2025-08-22

## 2025-08-22 RX ORDER — KETOROLAC TROMETHAMINE 30 MG/ML
INJECTION, SOLUTION INTRAMUSCULAR; INTRAVENOUS AS NEEDED
Status: DISCONTINUED | OUTPATIENT
Start: 2025-08-22 | End: 2025-08-22

## 2025-08-22 RX ORDER — SODIUM CHLORIDE, SODIUM LACTATE, POTASSIUM CHLORIDE, CALCIUM CHLORIDE 600; 310; 30; 20 MG/100ML; MG/100ML; MG/100ML; MG/100ML
INJECTION, SOLUTION INTRAVENOUS CONTINUOUS PRN
Status: DISCONTINUED | OUTPATIENT
Start: 2025-08-22 | End: 2025-08-22

## 2025-08-22 RX ORDER — ACETAMINOPHEN 10 MG/ML
INJECTION, SOLUTION INTRAVENOUS AS NEEDED
Status: DISCONTINUED | OUTPATIENT
Start: 2025-08-22 | End: 2025-08-22

## 2025-08-22 RX ORDER — LIDOCAINE HYDROCHLORIDE 20 MG/ML
INJECTION, SOLUTION INFILTRATION; PERINEURAL AS NEEDED
Status: DISCONTINUED | OUTPATIENT
Start: 2025-08-22 | End: 2025-08-22

## 2025-08-22 RX ORDER — HYDROMORPHONE HYDROCHLORIDE 1 MG/ML
INJECTION, SOLUTION INTRAMUSCULAR; INTRAVENOUS; SUBCUTANEOUS AS NEEDED
Status: DISCONTINUED | OUTPATIENT
Start: 2025-08-22 | End: 2025-08-22

## 2025-08-22 RX ORDER — PROPOFOL 10 MG/ML
INJECTION, EMULSION INTRAVENOUS AS NEEDED
Status: DISCONTINUED | OUTPATIENT
Start: 2025-08-22 | End: 2025-08-22

## 2025-08-22 RX ORDER — MIDAZOLAM HYDROCHLORIDE 1 MG/ML
INJECTION, SOLUTION INTRAMUSCULAR; INTRAVENOUS AS NEEDED
Status: DISCONTINUED | OUTPATIENT
Start: 2025-08-22 | End: 2025-08-22

## 2025-08-22 RX ORDER — CEFAZOLIN 1 G/1
INJECTION, POWDER, FOR SOLUTION INTRAVENOUS AS NEEDED
Status: DISCONTINUED | OUTPATIENT
Start: 2025-08-22 | End: 2025-08-22

## 2025-08-22 RX ORDER — ONDANSETRON HYDROCHLORIDE 2 MG/ML
INJECTION, SOLUTION INTRAVENOUS AS NEEDED
Status: DISCONTINUED | OUTPATIENT
Start: 2025-08-22 | End: 2025-08-22

## 2025-08-22 RX ADMIN — MIDAZOLAM HYDROCHLORIDE 2 MG: 1 INJECTION, SOLUTION INTRAMUSCULAR; INTRAVENOUS at 07:30

## 2025-08-22 RX ADMIN — HYDROMORPHONE HYDROCHLORIDE 0.5 MG: 1 INJECTION, SOLUTION INTRAMUSCULAR; INTRAVENOUS; SUBCUTANEOUS at 07:44

## 2025-08-22 RX ADMIN — NORETHINDRONE AND ETHINYL ESTRADIOL 15 MG: KIT ORAL at 08:12

## 2025-08-22 RX ADMIN — FENTANYL CITRATE 75 MCG: 50 INJECTION, SOLUTION INTRAMUSCULAR; INTRAVENOUS at 07:34

## 2025-08-22 RX ADMIN — KETOROLAC TROMETHAMINE 30 MG: 30 INJECTION, SOLUTION INTRAMUSCULAR; INTRAVENOUS at 08:38

## 2025-08-22 RX ADMIN — FENTANYL CITRATE 25 MCG: 50 INJECTION, SOLUTION INTRAMUSCULAR; INTRAVENOUS at 07:40

## 2025-08-22 RX ADMIN — HYDROMORPHONE HYDROCHLORIDE 0.5 MG: 1 INJECTION, SOLUTION INTRAMUSCULAR; INTRAVENOUS; SUBCUTANEOUS at 07:53

## 2025-08-22 RX ADMIN — CEFAZOLIN 2 G: 1 INJECTION, POWDER, FOR SOLUTION INTRAVENOUS at 07:40

## 2025-08-22 RX ADMIN — SODIUM CHLORIDE, SODIUM LACTATE, POTASSIUM CHLORIDE, CALCIUM CHLORIDE: 600; 310; 30; 20 INJECTION, SOLUTION INTRAVENOUS at 07:30

## 2025-08-22 RX ADMIN — PROPOFOL 200 MG: 10 INJECTION, EMULSION INTRAVENOUS at 07:35

## 2025-08-22 RX ADMIN — SODIUM CHLORIDE 250 ML: 900 IRRIGANT IRRIGATION at 07:45

## 2025-08-22 RX ADMIN — ACETAMINOPHEN 1000 MG: 10 INJECTION, SOLUTION INTRAVENOUS at 08:39

## 2025-08-22 RX ADMIN — BUPIVACAINE HYDROCHLORIDE 20 ML: 5 INJECTION, SOLUTION EPIDURAL; INTRACAUDAL; PERINEURAL at 08:44

## 2025-08-22 RX ADMIN — ONDANSETRON HYDROCHLORIDE 4 MG: 2 INJECTION, SOLUTION INTRAVENOUS at 07:30

## 2025-08-22 RX ADMIN — LIDOCAINE HYDROCHLORIDE 70 MG: 20 INJECTION, SOLUTION INFILTRATION; PERINEURAL at 07:35

## 2025-08-22 ASSESSMENT — PAIN SCALES - GENERAL
PAINLEVEL_OUTOF10: 0 - NO PAIN

## 2025-08-22 ASSESSMENT — PAIN - FUNCTIONAL ASSESSMENT
PAIN_FUNCTIONAL_ASSESSMENT: 0-10

## 2025-08-25 DIAGNOSIS — G89.18 POST-OP PAIN: Primary | ICD-10-CM

## 2025-08-25 RX ORDER — TRAMADOL HYDROCHLORIDE 50 MG/1
50 TABLET, FILM COATED ORAL EVERY 6 HOURS PRN
Qty: 28 TABLET | Refills: 0 | Status: SHIPPED | OUTPATIENT
Start: 2025-08-25 | End: 2025-09-01

## 2025-09-04 ENCOUNTER — OFFICE VISIT (OUTPATIENT)
Dept: ORTHOPEDIC SURGERY | Facility: CLINIC | Age: 45
End: 2025-09-04
Payer: COMMERCIAL

## 2025-09-04 VITALS — HEIGHT: 74 IN | BODY MASS INDEX: 23.23 KG/M2 | WEIGHT: 181 LBS

## 2025-09-04 DIAGNOSIS — G56.21 CUBITAL TUNNEL SYNDROME ON RIGHT: Primary | ICD-10-CM

## 2025-09-04 PROCEDURE — 99024 POSTOP FOLLOW-UP VISIT: CPT | Performed by: ORTHOPAEDIC SURGERY

## 2025-09-04 PROCEDURE — 1036F TOBACCO NON-USER: CPT | Performed by: ORTHOPAEDIC SURGERY

## 2025-09-04 PROCEDURE — 99213 OFFICE O/P EST LOW 20 MIN: CPT

## 2025-09-04 PROCEDURE — 3008F BODY MASS INDEX DOCD: CPT | Performed by: ORTHOPAEDIC SURGERY

## 2025-09-09 ENCOUNTER — APPOINTMENT (OUTPATIENT)
Dept: ORTHOPEDIC SURGERY | Facility: HOSPITAL | Age: 45
End: 2025-09-09
Payer: COMMERCIAL

## 2025-09-17 ENCOUNTER — APPOINTMENT (OUTPATIENT)
Dept: ORTHOPEDIC SURGERY | Facility: HOSPITAL | Age: 45
End: 2025-09-17

## (undated) DEVICE — GLOVE, SURGICAL, PROTEXIS PI W/NEU-THERA, 8.5, PF, LF

## (undated) DEVICE — SUTURE, ETHILON, 3-0, 18 IN, PS2, BLACK

## (undated) DEVICE — TOWEL, SURGICAL, NEURO, O/R, 16 X 26, BLUE, STERILE

## (undated) DEVICE — Device

## (undated) DEVICE — WAND, COBLATION, WEREWOLF FLOW 90

## (undated) DEVICE — MASK, FACE, TENET, FOAM POSITIONING, DISPOSABLE

## (undated) DEVICE — STRIP, SKIN CLOSURE, STERI STRIP, NON-REINFORCED, 0.5 X 4 IN

## (undated) DEVICE — GLOVE, SURGICAL, PROTEXIS PI W/NEU-THERA, 7.0, PF, LF

## (undated) DEVICE — UNDERPAD, DRI-SAFE, BLUE POLY BACKING

## (undated) DEVICE — DRESSING, GAUZE, SPONGE, 12 PLY, 4 X 4 IN, PLASTIC POUCH, STRL 10PK

## (undated) DEVICE — TUBING, OUTFLOW, DRAIN PIPE, W/ONE WAY VALVE (FMS VUE)

## (undated) DEVICE — GLOVE, SURGICAL, PROTEXIS PI MICRO, 6.5, PF, LF

## (undated) DEVICE — PADDING, WEBRIL, UNDERCAST, STERILE, 4 IN

## (undated) DEVICE — TUBING, NFLOW, FMS VUE, SNGL USE

## (undated) DEVICE — BLADE, SYNOVATOR, PLATINUM SERIES, 4.5MM

## (undated) DEVICE — DRAPE, SHEET, U, STERI DRAPE, 47 X 51 IN, DISPOSABLE, STERILE

## (undated) DEVICE — SYRINGE, 20 CC, LUER LOCK

## (undated) DEVICE — CUFF, TOURNIQUET, 18 X 4, DUAL PORT/SNGL BLADDER, DISP, LF

## (undated) DEVICE — BANDAGE, ELASTIC, REINFORCED, ECONO-WRAP, 4 IN X 4.5 YD, LATEX

## (undated) DEVICE — DRAPE, SHEET, 17 X 23 IN

## (undated) DEVICE — GLOVE, SURGICAL, PROTEXIS PI BLUE W/NEUTHERA, 8.0, PF, LF

## (undated) DEVICE — CANNULA, FLEXIBLE 6.5 X 72 THREAD CLEAR TRAC

## (undated) DEVICE — SUTURE, PDS II, 3-0, 18 IN, PS2, CLEAR

## (undated) DEVICE — GLOVE, SURGICAL, PROTEXIS PI , 7.5, PF, LF

## (undated) DEVICE — SUTURE, CTD, VICRYL, 2-0, UND, BR, CT-2

## (undated) DEVICE — BANDAGE, GAUZE, CONFORMING, KERLIX, 6 PLY, 4.5 IN X 4.1 YD

## (undated) DEVICE — BANDAGE, ELASTIC, COMPRESSION, W/REMOVABLE CLIP, 3 IN X 4.5 YD

## (undated) DEVICE — APPLICATOR, CHLORAPREP, W/ORANGE TINT, 26ML

## (undated) DEVICE — GLOVE, SURGICAL, PROTEXIS PI MICRO, 8.0, PF, LF

## (undated) DEVICE — SUTURE, MONOCRYLIC, 4-0, P3, MONO 18

## (undated) DEVICE — SUTURE, VICRYL 3-0, PRECISION POINT, PS-2 UNDYED 27 INCH